# Patient Record
Sex: MALE | Race: WHITE | NOT HISPANIC OR LATINO | Employment: FULL TIME | URBAN - METROPOLITAN AREA
[De-identification: names, ages, dates, MRNs, and addresses within clinical notes are randomized per-mention and may not be internally consistent; named-entity substitution may affect disease eponyms.]

---

## 2017-02-07 ENCOUNTER — OFFICE VISIT (OUTPATIENT)
Dept: FAMILY MEDICINE | Age: 27
End: 2017-02-07

## 2017-02-07 VITALS
SYSTOLIC BLOOD PRESSURE: 122 MMHG | TEMPERATURE: 98.6 F | HEART RATE: 70 BPM | DIASTOLIC BLOOD PRESSURE: 82 MMHG | HEIGHT: 75 IN | RESPIRATION RATE: 12 BRPM | BODY MASS INDEX: 29.84 KG/M2 | WEIGHT: 240 LBS

## 2017-02-07 DIAGNOSIS — H00.012 HORDEOLUM EXTERNUM OF RIGHT LOWER EYELID: Primary | ICD-10-CM

## 2017-02-07 PROCEDURE — 99213 OFFICE O/P EST LOW 20 MIN: CPT | Performed by: FAMILY MEDICINE

## 2018-03-30 ENCOUNTER — NURSE TRIAGE (OUTPATIENT)
Dept: FAMILY MEDICINE | Age: 28
End: 2018-03-30

## 2021-10-02 NOTE — PATIENT INSTRUCTIONS
Due for eye exam  Push fluids-water, gatordade at all times, especially when drinking alcohol  Trial meclizine: try tonight  Things to consider: PT for vertigo, head CT ([phone]), checking labs. If PT not working/helping may want to consider concussion clinic          Preventive Health Recommendations  Male Ages 26 - 39    Yearly exam:             See your health care provider every year in order to  o   Review health changes.   o   Discuss preventive care.    o   Review your medicines if your doctor has prescribed any.    You should be tested each year for STDs (sexually transmitted diseases), if you re at risk.     After age 35, talk to your provider about cholesterol testing. If you are at risk for heart disease, have your cholesterol tested at least every 5 years.     If you are at risk for diabetes, you should have a diabetes test (fasting glucose).  Shots: Get a flu shot each year. Get a tetanus shot every 10 years.     Nutrition:    Eat at least 5 servings of fruits and vegetables daily.     Eat whole-grain bread, whole-wheat pasta and brown rice instead of white grains and rice.     Get adequate Calcium and Vitamin D.     Lifestyle    Exercise for at least 150 minutes a week (30 minutes a day, 5 days a week). This will help you control your weight and prevent disease.     Limit alcohol to one drink per day.     No smoking.     Wear sunscreen to prevent skin cancer.     See your dentist every six months for an exam and cleaning.

## 2021-10-02 NOTE — PROGRESS NOTES
SUBJECTIVE:   CC: Conor Matthew is an 31 year old male who presents for preventative health visit.       Patient has been advised of split billing requirements and indicates understanding: Yes  Healthy Habits:     Getting at least 3 servings of Calcium per day:  NO    Bi-annual eye exam:  NO    Dental care twice a year:  Yes    Sleep apnea or symptoms of sleep apnea:  None    Diet:  Regular (no restrictions)    Frequency of exercise:  1 day/week    Duration of exercise:  Less than 15 minutes    Taking medications regularly:  Yes    Medication side effects:  None    PHQ-2 Total Score: 0    Additional concerns today:  No        July 31st hit his head at a wedding. Hit the front of his head, got right up, was fine, went to sleep. Had been drinking alcohol. Was at another wedding the next day also. Golfed fine.   On the way home felt like having a panic attack, chest was heavy, trouble breathing, hands shaking. Went to the ER-given IVFs, didn't do a head CT. Was thought it was likely related to dehydration. Since then things are slowly improving. Still having issues with driving.  Tuesday was going to  8th grade basketball team, had to pull over, had his fiance get him. He googled a few things like vertigo, inner ear stuff. Is getting  next weekend and doesn't want this to occur then.     Also having some cervical neck issues since hitting his head. Has always cracked his neck but lately he feels more discomfort that comes and goes. Sometimes 5-7/10 in pain. Denies headaches.     Uses nicotine pouches, hasn't been doing it as much lately. Tried to stop cold turkey, soon after 7/31. Still using from time to time. marijuana some tiems  Stopped caffiene after head injury. Restarted 2 weeks ago once in a while    Tuesday, 2 days ago, it lasted the entire night and the next day. Even today still feels vision fuzzy and brain fuzziness. He did work today and felt fine.     Ended up taking a full week at home after  realizing he likely had concussion. Tried doing the vertigo head exercises at home and at first didn't feel the driving anxiety. This past weekend had friends in town, didn't drink too much he feels but also felt increased symptoms.     Feels like when he is in the car it triggers it. Also feels like vision is focusing on one thing and peripheral is a little fuzzy. Vision stuff doesn't make him feel dizzy, but feels he has an aura around his vision. The sun makes things a little worse/eye irritation.     It stresses him out when he is driving.  He is still working daily, works at a computer, doesn't feel he struggles during the day with that.  Had 14 wedding this year.     2 months prior to this, had the same thing occur coming back from a wedding.  He pulled over, let fiance drive, napped then felt better.     Hx of seizure back in college-senior year of college. 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 Not on medication.     When not at weddings, alcohol intake is more than 5 beers on a weekend but recently trying to keep to 4 within a day. Diet: varies between meal planning and fast food/restaurants. During the work week drinks lots of water. Admits on the weekend not staying hydrated.     Going to [facility] on the 19th.       Today's PHQ-2 Score:   PHQ-2 ( 1999 Pfizer) 10/7/2021   Q1: Little interest or pleasure in doing things 0   Q2: Feeling down, depressed or hopeless 0   PHQ-2 Score 0   Q1: Little interest or pleasure in doing things Not at all   Q2: Feeling down, depressed or hopeless Not at all   PHQ-2 Score 0       Abuse: Current or Past(Physical, Sexual or Emotional)- No  Do you feel safe in your environment? No    Have you ever done Advance Care Planning? (For example, a Health Directive, POLST, or a discussion with a medical provider or your loved ones about your wishes): Yes, patient states has an Advance Care Planning document and will bring a copy to the clinic.    Social History      Tobacco Use     Smoking status: Never Smoker     Smokeless tobacco: Current User     Types: Snuff   Substance Use Topics     Alcohol use: Yes     Comment: socially     If you drink alcohol do you typically have >3 drinks per day or >7 drinks per week? No    Alcohol Use 10/7/2021   Prescreen: >3 drinks/day or >7 drinks/week? Yes   Prescreen: >3 drinks/day or >7 drinks/week? -   AUDIT SCORE  8     AUDIT - Alcohol Use Disorders Identification Test - Reproduced from the World Health Organization Audit 2001 (Second Edition) 10/7/2021   1.  How often do you have a drink containing alcohol? 2 to 3 times a week   2.  How many drinks containing alcohol do you have on a typical day when you are drinking? 5 or 6   3.  How often do you have five or more drinks on one occasion? Weekly   4.  How often during the last year have you found that you were not able to stop drinking once you had started? Never   5.  How often during the last year have you failed to do what was normally expected of you because of drinking? Never   6.  How often during the last year have you needed a first drink in the morning to get yourself going after a heavy drinking session? Never   7.  How often during the last year have you had a feeling of guilt or remorse after drinking? Never   8.  How often during the last year have you been unable to remember what happened the night before because of your drinking? Never   9.  Have you or someone else been injured because of your drinking? No   10. Has a relative, friend, doctor or other health care worker been concerned about your drinking or suggested you cut down? No   TOTAL SCORE 8       Last PSA: No results found for: PSA    Reviewed orders with patient. Reviewed health maintenance and updated orders accordingly - Yes  Lab work is in process    Reviewed and updated as needed this visit by clinical staff  Tobacco  Allergies  Meds  Problems  Med Hx  Surg Hx  Fam Hx  Soc Hx          Reviewed and  "updated as needed this visit by Provider  Tobacco   Meds  Problems  Med Hx  Surg Hx  Fam Hx             Review of Systems   Constitutional: Negative for chills and fever.   HENT: Negative for congestion, ear pain, hearing loss and sore throat.    Eyes: Positive for visual disturbance. Negative for pain.   Respiratory: Positive for shortness of breath. Negative for cough.    Cardiovascular: Positive for palpitations. Negative for chest pain and peripheral edema.   Gastrointestinal: Negative for abdominal pain, constipation, diarrhea, heartburn, hematochezia and nausea.   Genitourinary: Negative for discharge, dysuria, frequency, genital sores, hematuria, impotence and urgency.   Musculoskeletal: Negative for arthralgias, joint swelling and myalgias.   Skin: Negative for rash.   Neurological: Positive for dizziness. Negative for weakness, headaches and paresthesias.   Psychiatric/Behavioral: Negative for mood changes. The patient is nervous/anxious.          OBJECTIVE:   /84   Pulse 80   Resp 18   Ht 1.905 m (6' 3\")   Wt 105.2 kg (232 lb)   BMI 29.00 kg/m      Physical Exam  GENERAL: healthy, alert and no distress  EYES: Eyes grossly normal to inspection, PERRL and conjunctivae and sclerae normal  HENT: ear canals and TM's normal, nose and mouth without ulcers or lesions  NECK: no adenopathy, no asymmetry, masses, or scars and thyroid normal to palpation  RESP: lungs clear to auscultation - no rales, rhonchi or wheezes  CV: regular rate and rhythm, normal S1 S2, no S3 or S4, no murmur, click or rub  ABDOMEN: soft, nontender, no hepatosplenomegaly, no masses and bowel sounds normal  MS: no gross musculoskeletal defects noted, no edema  SKIN: no suspicious lesions or rashes  NEURO: Normal strength and tone, mentation intact and speech normal PERRL.  Visual EOMs all extraocular movements noted eye strain but no nystagmus noted  PSYCH: mentation appears normal, affect normal/bright    Diagnostic Test " Results:  Labs reviewed in Epic  Results for orders placed or performed in visit on 10/07/21 (from the past 24 hour(s))   CBC with platelets   Result Value Ref Range    WBC Count 5.6 4.0 - 11.0 10e3/uL    RBC Count 5.28 4.40 - 5.90 10e6/uL    Hemoglobin 16.6 13.3 - 17.7 g/dL    Hematocrit 46.8 40.0 - 53.0 %    MCV 89 78 - 100 fL    MCH 31.4 26.5 - 33.0 pg    MCHC 35.5 31.5 - 36.5 g/dL    RDW 13.1 10.0 - 15.0 %    Platelet Count 206 150 - 450 10e3/uL       ASSESSMENT/PLAN:       ICD-10-CM    1. Routine general medical examination at a health care facility  Z00.00 INFLUENZA VACCINE IM > 6 MONTHS VALENT IIV4 (AFLURIA/FLUZONE)     TDAP VACCINE (Adacel, Boostrix)  [[id number]]   2. Head injury, initial encounter  S09.90XA    3. Need for prophylactic vaccination and inoculation against influenza  Z23    4. History of seizure  Z87.898    5. History of dehydration  Z86.39    6. Vertigo  R42 Comprehensive metabolic panel (BMP + Alb, Alk Phos, ALT, AST, Total. Bili, TP)     CBC with platelets     Magnesium     Physical Therapy Referral     CT Head w/o Contrast     Magnesium     CBC with platelets     Comprehensive metabolic panel (BMP + Alb, Alk Phos, ALT, AST, Total. Bili, TP)     CANCELED: Physical Therapy Referral   7. History of concussion  Z87.820 Physical Therapy Referral     CT Head w/o Contrast     CANCELED: Physical Therapy Referral   8. Neck pain  M54.2 Physical Therapy Referral     CANCELED: Physical Therapy Referral   9. Vision changes  H53.9 CT Head w/o Contrast     Patient aware of additional charges on top of physical with extra test done.  Ongoing vision changes, neck pain, with history of concussion on July 31.  Labs are pending, follow-up with his low therapy, push fluids, consider getting CT of the head to rule out other causes of symptoms    Follow-up in a month or 2 if no improvement        Patient has been advised of split billing requirements and indicates understanding: Yes  COUNSELING:   Reviewed  "preventive health counseling, as reflected in patient instructions    Estimated body mass index is 29 kg/m  as calculated from the following:    Height as of this encounter: 1.905 m (6' 3\").    Weight as of this encounter: 105.2 kg (232 lb).         He reports that he has never smoked. His smokeless tobacco use includes snuff.      Counseling Resources:  ATP IV Guidelines  Pooled Cohorts Equation Calculator  FRAX Risk Assessment  ICSI Preventive Guidelines  Dietary Guidelines for Americans, 2010  USDA's MyPlate  ASA Prophylaxis  Lung CA Screening    VICTORINA Sheridan, NP-C  [facility]    "

## 2021-10-07 ENCOUNTER — OFFICE VISIT (OUTPATIENT)
Dept: FAMILY MEDICINE | Facility: CLINIC | Age: 31
End: 2021-10-07
Payer: COMMERCIAL

## 2021-10-07 VITALS
SYSTOLIC BLOOD PRESSURE: 124 MMHG | HEIGHT: 75 IN | DIASTOLIC BLOOD PRESSURE: 84 MMHG | HEART RATE: 80 BPM | BODY MASS INDEX: 28.85 KG/M2 | WEIGHT: 232 LBS | RESPIRATION RATE: 18 BRPM

## 2021-10-07 DIAGNOSIS — Z86.39 HISTORY OF DEHYDRATION: ICD-10-CM

## 2021-10-07 DIAGNOSIS — Z23 NEED FOR PROPHYLACTIC VACCINATION AND INOCULATION AGAINST INFLUENZA: ICD-10-CM

## 2021-10-07 DIAGNOSIS — M54.2 NECK PAIN: ICD-10-CM

## 2021-10-07 DIAGNOSIS — S09.90XA HEAD INJURY, INITIAL ENCOUNTER: ICD-10-CM

## 2021-10-07 DIAGNOSIS — Z87.898 HISTORY OF SEIZURE: ICD-10-CM

## 2021-10-07 DIAGNOSIS — R42 VERTIGO: ICD-10-CM

## 2021-10-07 DIAGNOSIS — H53.9 VISION CHANGES: ICD-10-CM

## 2021-10-07 DIAGNOSIS — Z00.00 ROUTINE GENERAL MEDICAL EXAMINATION AT A HEALTH CARE FACILITY: Primary | ICD-10-CM

## 2021-10-07 DIAGNOSIS — Z87.820 HISTORY OF CONCUSSION: ICD-10-CM

## 2021-10-07 LAB
ERYTHROCYTE [DISTWIDTH] IN BLOOD BY AUTOMATED COUNT: 13.1 % (ref 10–15)
HCT VFR BLD AUTO: 46.8 % (ref 40–53)
HGB BLD-MCNC: 16.6 G/DL (ref 13.3–17.7)
MCH RBC QN AUTO: 31.4 PG (ref 26.5–33)
MCHC RBC AUTO-ENTMCNC: 35.5 G/DL (ref 31.5–36.5)
MCV RBC AUTO: 89 FL (ref 78–100)
PLATELET # BLD AUTO: 206 10E3/UL (ref 150–450)
RBC # BLD AUTO: 5.28 10E6/UL (ref 4.4–5.9)
WBC # BLD AUTO: 5.6 10E3/UL (ref 4–11)

## 2021-10-07 PROCEDURE — 99385 PREV VISIT NEW AGE 18-39: CPT | Mod: 25 | Performed by: NURSE PRACTITIONER

## 2021-10-07 PROCEDURE — 90472 IMMUNIZATION ADMIN EACH ADD: CPT | Performed by: NURSE PRACTITIONER

## 2021-10-07 PROCEDURE — 90686 IIV4 VACC NO PRSV 0.5 ML IM: CPT | Performed by: NURSE PRACTITIONER

## 2021-10-07 PROCEDURE — 85027 COMPLETE CBC AUTOMATED: CPT | Performed by: NURSE PRACTITIONER

## 2021-10-07 PROCEDURE — 90715 TDAP VACCINE 7 YRS/> IM: CPT | Performed by: NURSE PRACTITIONER

## 2021-10-07 PROCEDURE — 36415 COLL VENOUS BLD VENIPUNCTURE: CPT | Performed by: NURSE PRACTITIONER

## 2021-10-07 PROCEDURE — 80053 COMPREHEN METABOLIC PANEL: CPT | Performed by: NURSE PRACTITIONER

## 2021-10-07 PROCEDURE — 99214 OFFICE O/P EST MOD 30 MIN: CPT | Mod: 25 | Performed by: NURSE PRACTITIONER

## 2021-10-07 PROCEDURE — 90471 IMMUNIZATION ADMIN: CPT | Performed by: NURSE PRACTITIONER

## 2021-10-07 PROCEDURE — 83735 ASSAY OF MAGNESIUM: CPT | Performed by: NURSE PRACTITIONER

## 2021-10-07 ASSESSMENT — MIFFLIN-ST. JEOR: SCORE: 2102.98

## 2021-10-07 ASSESSMENT — ENCOUNTER SYMPTOMS
ARTHRALGIAS: 0
NAUSEA: 0
HEMATOCHEZIA: 0
CHILLS: 0
HEARTBURN: 0
COUGH: 0
HEADACHES: 0
SORE THROAT: 0
HEMATURIA: 0
MYALGIAS: 0
NERVOUS/ANXIOUS: 1
PALPITATIONS: 1
CONSTIPATION: 0
PARESTHESIAS: 0
JOINT SWELLING: 0
DIZZINESS: 1
DYSURIA: 0
SHORTNESS OF BREATH: 1
FREQUENCY: 0
DIARRHEA: 0
EYE PAIN: 0
WEAKNESS: 0
ABDOMINAL PAIN: 0
FEVER: 0

## 2021-10-07 NOTE — LETTER
October 11, 2021      Conor Matthew  [address]  [place] [facility] [address]        Dear ,    We are writing to inform you of your test results.    All of your labs were normal for you.     Please contact the clinic if you have additional questions.  Thank you.      Resulted Orders   Magnesium   Result Value Ref Range    Magnesium 2.1 1.6 - 2.3 mg/dL   CBC with platelets   Result Value Ref Range    WBC Count 5.6 4.0 - 11.0 10e3/uL    RBC Count 5.28 4.40 - 5.90 10e6/uL    Hemoglobin 16.6 13.3 - 17.7 g/dL    Hematocrit 46.8 40.0 - 53.0 %    MCV 89 78 - 100 fL    MCH 31.4 26.5 - 33.0 pg    MCHC 35.5 31.5 - 36.5 g/dL    RDW 13.1 10.0 - 15.0 %    Platelet Count 206 150 - 450 10e3/uL   Comprehensive metabolic panel (BMP + Alb, Alk Phos, ALT, AST, Total. Bili, TP)   Result Value Ref Range    Sodium 138 133 - 144 mmol/L    Potassium 4.3 3.4 - 5.3 mmol/L    Chloride 105 94 - 109 mmol/L    Carbon Dioxide (CO2) 29 20 - 32 mmol/L    Anion Gap 4 3 - 14 mmol/L    Urea Nitrogen 18 7 - 30 mg/dL    Creatinine 1.14 0.66 - 1.25 mg/dL    Calcium 9.2 8.5 - 10.1 mg/dL    Glucose 87 70 - 99 mg/dL    Alkaline Phosphatase 58 40 - 150 U/L    AST 22 0 - 45 U/L    ALT 42 0 - 70 U/L    Protein Total 7.3 6.8 - 8.8 g/dL    Albumin 4.1 3.4 - 5.0 g/dL    Bilirubin Total 0.3 0.2 - 1.3 mg/dL    GFR Estimate 86 >60 mL/min/1.73m2      Comment:      As of July 11, 2021, eGFR is calculated by the CKD-EPI creatinine equation, without race adjustment. eGFR can be influenced by muscle mass, exercise, and diet. The reported eGFR is an estimation only and is only applicable if the renal function is stable.       If you have any questions or concerns, please call the clinic at the number listed above.       Sincerely,      Juli Grant NP

## 2021-10-08 LAB
ALBUMIN SERPL-MCNC: 4.1 G/DL (ref 3.4–5)
ALP SERPL-CCNC: 58 U/L (ref 40–150)
ALT SERPL W P-5'-P-CCNC: 42 U/L (ref 0–70)
ANION GAP SERPL CALCULATED.3IONS-SCNC: 4 MMOL/L (ref 3–14)
AST SERPL W P-5'-P-CCNC: 22 U/L (ref 0–45)
BILIRUB SERPL-MCNC: 0.3 MG/DL (ref 0.2–1.3)
BUN SERPL-MCNC: 18 MG/DL (ref 7–30)
CALCIUM SERPL-MCNC: 9.2 MG/DL (ref 8.5–10.1)
CHLORIDE BLD-SCNC: 105 MMOL/L (ref 94–109)
CO2 SERPL-SCNC: 29 MMOL/L (ref 20–32)
CREAT SERPL-MCNC: 1.14 MG/DL (ref 0.66–1.25)
GFR SERPL CREATININE-BSD FRML MDRD: 86 ML/MIN/1.73M2
GLUCOSE BLD-MCNC: 87 MG/DL (ref 70–99)
MAGNESIUM SERPL-MCNC: 2.1 MG/DL (ref 1.6–2.3)
POTASSIUM BLD-SCNC: 4.3 MMOL/L (ref 3.4–5.3)
PROT SERPL-MCNC: 7.3 G/DL (ref 6.8–8.8)
SODIUM SERPL-SCNC: 138 MMOL/L (ref 133–144)

## 2021-10-08 NOTE — RESULT ENCOUNTER NOTE
Send letter and results    Mr. Matthew,    All of your labs were normal for you.    Please contact the clinic if you have additional questions.  Thank you.    Sincerely,    VICTORINA Sheridan, NP-C  [facility]

## 2021-10-11 ENCOUNTER — TELEPHONE (OUTPATIENT)
Dept: FAMILY MEDICINE | Facility: CLINIC | Age: 31
End: 2021-10-11

## 2021-10-11 ENCOUNTER — HOSPITAL ENCOUNTER (OUTPATIENT)
Dept: PHYSICAL THERAPY | Facility: CLINIC | Age: 31
Setting detail: THERAPIES SERIES
End: 2021-10-11
Attending: NURSE PRACTITIONER
Payer: COMMERCIAL

## 2021-10-11 DIAGNOSIS — Z87.820 HISTORY OF CONCUSSION: ICD-10-CM

## 2021-10-11 DIAGNOSIS — F41.9 ANXIETY: Primary | ICD-10-CM

## 2021-10-11 DIAGNOSIS — F41.9 ANXIETY: ICD-10-CM

## 2021-10-11 DIAGNOSIS — R42 VERTIGO: ICD-10-CM

## 2021-10-11 DIAGNOSIS — M54.2 NECK PAIN: ICD-10-CM

## 2021-10-11 PROCEDURE — 97161 PT EVAL LOW COMPLEX 20 MIN: CPT | Mod: GP | Performed by: PHYSICAL THERAPIST

## 2021-10-11 RX ORDER — PROPRANOLOL HYDROCHLORIDE 10 MG/1
TABLET ORAL
Qty: 60 TABLET | Refills: 0 | Status: SHIPPED | OUTPATIENT
Start: 2021-10-11 | End: 2021-10-11

## 2021-10-11 RX ORDER — PROPRANOLOL HYDROCHLORIDE 10 MG/1
TABLET ORAL
Qty: 60 TABLET | Refills: 0 | Status: SHIPPED | OUTPATIENT
Start: 2021-10-11 | End: 2021-11-08

## 2021-10-11 NOTE — PROGRESS NOTES
10/11/21 1233   Quick Adds   Quick Adds Vestibular Eval   Type of Visit Initial OP PT Evaluation   General Information   Start of Care Date 10/11/21   Referring Physician Juli Grant NP   Orders Evaluate and Treat as Indicated   Order Date 10/07/21   Medical Diagnosis Vertigo, Neck pain, history of conussion   Onset of illness/injury or Date of Surgery 10/07/21  (date of order)   Surgical/Medical history reviewed Yes   Pertinent history of current problem (include personal factors and/or comorbidities that impact the POC) In April 2021, he began feeling of rush of blood to his head, light headed feeling, palms sweaty, feels like he may pass out or like a seizure.  He was driving during this time and pulled over.  He napped for about 2 hours. Upon waking his symptoms had resolved and he was able to continue driving. On July 31, he hit his head while getting into bed. He woke up the next day and played golf and went to a wedding. He consumed some alcohol and did not notice any significant symptoms.  The following day he was driving home and had another episode: rush of blood, light headedness, palms sweaty.  He pulled over and tried to calm down and lie down.  He called 911 and was taken to the ER and he was provided IV fluids and all imaging was normal.  He was taken home by this family.  He ended up driving again a couple days later and fell overwhelmed. He had some sensitivity to light and difficulty with moving environment in his peripheral vision. He had to pull over multiple times during the 5.5 hour drive.  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 He ended up taking about 5-6 days off from work with reduced screen time and activities. He started to feel better, so he returned to work full time and mainly had some symptoms with driving.  Returned to playing volleyball as well.   He felt at about 70% back to normal.  Sensitivity to sun and racing heart rate with exercise.  Then progressed to  80-85% and was tolerating volleyball with stable HR and some minor visual issue.  About one week ago, he drove about 1 hour to Basketball practice. About 25 minutes into the drive, he began having his episode and had to pull over and was picked up by this fiancé.  He has only been getting this episodes when he drives for longer distances and immediately after when he is lying in bed. Heart racing, palm alonso and jittery.  Overall he is nervous he might have these episodes whenever he is driving. Ongoing symptoms: mental fogginess, difficulty with focusing.  Headache: denies.  Dizziness: uncertain. Balance issues: no falls. Neck pain: Right side, sharp pain with flexion and left side bending. Self massage for management. PMH: h/o seizure 2014, possible concussion in college.   Prior level of function comment Works full time in sales. Lives with his fiance in a house.  Independent with all mobility and typically plays volleyball and coaches basketball.    Patient/Family Goals Statement determine cause of his episodes   Fall Risk Screen   Fall screen completed by PT   Have you fallen 2 or more times in the past year? No   Have you fallen and had an injury in the past year? No   Is patient a fall risk? No   Abuse Screen (yes response referral indicated)   Feels Unsafe at Home or Work/School no   Feels Threatened by Someone no   Cognitive Status Examination   Orientation orientation to person, place and time   Level of Consciousness alert   Follows Commands and Answers Questions 100% of the time;able to follow multistep instructions   Posture   Posture Normal   Bed Mobility   Bed Mobility Comments Independent   Transfer Skills   Transfer Comments Independent   Gait   Gait Comments Independent   Balance Special Tests Modified CTSIB Conditions   Condition 1, seconds 30 Seconds   Condition 2, seconds 30 Seconds   Condition 4, seconds 30 Seconds   Condition 5, seconds 30 Seconds   Cervicogenic Screen   Neck ROM  Flexion/extension: WNL, R rotation: 62 degree, L rotation: 69 degrees, Side bending WNL   Alar Ligament Test Normal   Transverse Ligament Test Normal   Oculomotor Exam   Smooth Pursuit Normal   Saccades Normal   VOR Normal   VOR Comments pt reports mild dizziness/blurry vision   VOR Cancellation Normal   Rapid Head Thrust Normal   Convergence Testing Normal   Infrared Goggle Exam or Frenzel Lense Exam   Vestibular Suppressant in Last 24 Hours? No   Exam completed with Infrared Goggles   Spontaneous Nystagmus Negative   Gaze Evoked Nystagmus Negative   Head Shake Horizontal Nystagmus Negative   [place]pike (right) Negative   [place]pike (Left) Negative   HSCC Supine Roll Test (Right) Negative   HSCC Supine Roll Test (Left) Negative   Dynamic Visual Acuity (DVA)   Static Acuity (LogMar) -0.1   Horizontal Head Movement at 1 Hz (LogMar) -0.1   Horizontal Head Movement at 2 Hz (LogMar) -0.1   DVA Comments Mild blurry vision noted post 2Hz   Clinical Impression   Criteria for Skilled Therapeutic Interventions Met evaluation only   Clinical Presentation Stable/Uncomplicated   Clinical Presentation Rationale presentation, negative vestibular findings   Clinical Decision Making (Complexity) Low complexity   Risk & Benefits of therapy have been explained Yes   Patient, Family & other staff in agreement with plan of care Yes   Clinical Impression Comments Pt is a 32 yo male who presents with episodes of blurry vision, racing heart rate, light headedness when driving.  PMH includes prior concussion and h/o seizure.  Negative imaging from recent ER visit in August 2021.  Pt notes episodes started in April 2021 and increased after he hit his head on July 31.  PT evaluation reveals normal oculomotor assessment, negative findings for BPPV and normal DVA.  He also score normal balance on the mCTSIB.  Due to negative findings with evaluation assessment, pt was recommended to return to his referring provider to address other  possible causes of his symptoms.  If pt continues to have symptoms after being addressed by medication or other sources, physical therapy may be indicated to address questionable motion sensitivity while driving.   Total Evaluation Time   PT Fazal Low Complexity Minutes ([id number]) 79

## 2021-10-11 NOTE — TELEPHONE ENCOUNTER
Provider:  Are you willing to give him something for anxiety to get him through the wedding this weekend? They are leaving at 3:00 pm today up [place]. Thank you. Kasey Obando R.N.    This refill/encounter is being handled by a team outside your facility.  If action needs to be taken, please route the encounter back to your team that would normally handle it. Please do not send directly back to the sender. Thank you. Kasey Obando R.N.    Patient was in for a physical last week. Advised PT and he was able to get an  appointment today in [place]. Still experiencing anxiety like symptoms - heart beating really hard, hands sweating.  He is a little anxious. He is asking if there is something that he can be given to help with his anxiety for a short period of time. He is getting  this weekend and is worried about getting through this event. He is aware the provider is done for the day, but we will try to cath her.     Ok to leave a message with the provider's response.

## 2021-10-11 NOTE — TELEPHONE ENCOUNTER
propranolol (INDERAL) 10 MG tablet   Prescription sent to [facility] in [place] on [address], sent as previously ordered by provider.  Patient informed.      Susana Rhodes RN  [facility]

## 2021-10-11 NOTE — TELEPHONE ENCOUNTER
Provider called and spoke to patient.  We can trial propranolol: 10 mg twice a day as needed for anxiety (if he takes it twice within a day, recommend to take at least 4-5 hours apart).  Monitor for lightheadedness, it can also bring the blood pressure down some but at this dose shouldn't bring it down too much. He will update provider if for some reason this medication is not working at all for him in a day or two.     Thank you,  VICTORINA Sheridan, NP-C  [facility]

## 2021-10-11 NOTE — TELEPHONE ENCOUNTER
PT called stating that RX that as sent to [facility] needs to be routed to [facility] in [place] dut to [facility] systems being down. PT states he is headed out of town and needs this sent ASAP I advised PT I would route message at high priority

## 2021-10-26 ENCOUNTER — ANCILLARY PROCEDURE (OUTPATIENT)
Dept: CT IMAGING | Facility: CLINIC | Age: 31
End: 2021-10-26
Attending: NURSE PRACTITIONER
Payer: COMMERCIAL

## 2021-10-26 DIAGNOSIS — Z87.820 HISTORY OF CONCUSSION: ICD-10-CM

## 2021-10-26 DIAGNOSIS — H53.9 VISION CHANGES: ICD-10-CM

## 2021-10-26 DIAGNOSIS — R42 VERTIGO: ICD-10-CM

## 2021-10-26 PROCEDURE — 70450 CT HEAD/BRAIN W/O DYE: CPT | Mod: GC | Performed by: STUDENT IN AN ORGANIZED HEALTH CARE EDUCATION/TRAINING PROGRAM

## 2021-10-26 NOTE — RESULT ENCOUNTER NOTE
Rodger Perdomo,   Your head CT was normal. I hope the propranolol is helping. Follow up with me in a few weeks if things are not improved. I hope your wedding weekend went well!   Thank you,  VICTORINA Sheridan, NP-C  [facility]

## 2021-10-28 ENCOUNTER — TELEPHONE (OUTPATIENT)
Dept: FAMILY MEDICINE | Facility: CLINIC | Age: 31
End: 2021-10-28

## 2021-10-28 NOTE — TELEPHONE ENCOUNTER
Would recommend no driving until appointment next week and would recommend in person appointment instead of video. If he can be fit into an appointment with someone tomorrow preferred otherwise okay to take same day slot on Monday morning for patient.     Thank you,  VICTORINA Sheridan, NP-C  [facility]

## 2021-10-28 NOTE — TELEPHONE ENCOUNTER
"Incoming call received from pt.   Pt reports he is currently using propranolol 20 mg twice daily as needed for anxiety.   Pt states almost all of his sx that were discussed with provider on 10/7/21 continue, except the \"thumping heart\" has resolved since beginning propranolol.    Pt denies any new or worsening sx.   He states he continues to be symptomatic primarily when it is raf, or when driving. He describes sx as \"panic attack-neelima symptoms.\"  Pt states continuing sx include intermittently sweaty hands, dizziness, and vision change affecting peripheral vision. \"Thumping heart\" has resolved.  Pt states PT has evaluated him and explained to him they do not believe his sx are vertigo or concussion related.    Pt is scheduled for video visit with provider on 11/2/21 at 1:20pm. Pt inquires if any further action should be taken prior to the 11/2/21 follow-up visit.     propranolol (INDERAL) 10 MG tablet 60 tablet 0 10/11/2021  No   Sig: Take 10 mg (1 tab) twice a day as needed for anxiety.  May increase to 20 mg (2 tab) twice a day as needed if lower dose not helping       Hi Conor,   Your head CT was normal. I hope the propranolol is helping. Follow up with me in a few weeks if things are not improved. I hope your wedding weekend went well!   Thank you,  VICTORINA Sheridan, NP-[facility]   Written by Juli Grant NP on 10/26/2021 11:48 AM CDT  Seen by patient Conor Matthew on 10/26/2021 11:52 AM CDT    SHERON Pena, RN    "

## 2021-10-29 NOTE — PROGRESS NOTES
Assessment & Plan     Tunnel vision, bilateral  Having tunnel vision/dizziness/anxiety sweaty palms when driving. Also having light sensitiity when it is really bright out. Big crowds also are challenging for him. Consider visual disturbance vs other possible neurological issue vs anxiety  - Adult Neurology Referral; Future  - Adult Eye Referral; Future      Return in about 4 weeks (around 11/29/2021), or if symptoms worsen or fail to improve.    VICTORINA Sheridan, NP-C  [facility] RONALD Perdomo is a 31 year old who presents for the following health issues  accompanied by his self.    Propanolol helps with the heart pounding but all the other symptoms are still there    History of Present Illness       Mental Health Follow-up:  Patient presents to follow-up on Anxiety.    Patient's anxiety since last visit has been:  Medium  The patient is not having other symptoms associated with anxiety.  Any significant life events: health concerns  Patient is feeling anxious or having panic attacks.  Patient has no concerns about alcohol or drug use.     Social History  Tobacco Use    Smoking status: Never Smoker    Smokeless tobacco: Current User      Types: Snuff  Vaping Use    Vaping Use: Never used  Alcohol use: Yes    Comment: socially  Drug use: Yes    Types: Marijuana    Comment: socially      Today's PHQ-9         PHQ-9 Total Score:     (P) 1   PHQ-9 Q9 Thoughts of better off dead/self-harm past 2 weeks :   (P) Not at all   Thoughts of suicide or self harm:      Self-harm Plan:        Self-harm Action:          Safety concerns for self or others:           He eats 0-1 servings of fruits and vegetables daily.He consumes 0 sweetened beverage(s) daily.He exercises with enough effort to increase his heart rate 10 to 19 minutes per day.  He exercises with enough effort to increase his heart rate 3 or less days per week.   He is taking medications regularly.         Taking propranolol 20 mg  "twice a day. Doesn't get pounding heart anymore. He drove last night short distance from a friends, hands very sweating, feeling like he had trouble focusing.  Lights, big crowds, driving are his main triggers with sweating palms. He almost feels like he can't swallow. ? Vertical heterophoria. binacular vision dysfuntion. Mis-allignment in eyes.     Had to use it twice a day during his wedding. After pictures he had to go lay down a bit. On honeymoon had a few issues, he was still able to go and do stuff.     Hx of anemia, low iron  Father hx DVT in 2010 in lungs  Aunt uncle have factor 5  Some hypoglycemia in family  Mother has hypothyroidism      Got COVID-19 vaccine few days before [facility]. Occurred first at [facility] 2021, then again July 31st after he hit his head at a wedding few days after.     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 It is in [place]        Review of Systems   Constitutional, HEENT, cardiovascular, pulmonary, gi and gu systems are negative, except as otherwise noted.      Objective    /88 (BP Location: Right arm, Patient Position: Sitting, Cuff Size: Adult Large)   Pulse 69   Temp 98.4  F (36.9  C) (Oral)   Resp 16   Ht 1.892 m (6' 2.5\")   Wt 107 kg (236 lb)   SpO2 100%   BMI 29.90 kg/m    Body mass index is 29.9 kg/m .  Physical Exam   GENERAL: healthy, alert and no distress  EYES: Eyes grossly normal to inspection, PERRL and conjunctivae and sclerae normal  HENT: ear canals and TM's normal, nose and mouth without ulcers or lesions  NECK: no adenopathy, no asymmetry, masses, or scars and thyroid normal to palpation  RESP: lungs clear to auscultation - no rales, rhonchi or wheezes  CV: regular rate and rhythm, normal S1 S2, no S3 or S4, no murmur, click or rub  MS: no gross musculoskeletal defects noted, no edema  NEURO: Normal strength and tone, mentation intact and speech normal, PERRL, EOM caused some difficulty with patient feeling like " his eyes were not keeping up with the movements    No labs today, reviewed  Past ones            Answers for HPI/ROS submitted by the patient on 11/1/2021  If you checked off any problems, how difficult have these problems made it for you to do your work, take care of things at home, or get along with other people?: Somewhat difficult  PHQ9 TOTAL SCORE: 1  ERWIN 7 TOTAL SCORE: 10       yes

## 2021-11-01 ENCOUNTER — TELEPHONE (OUTPATIENT)
Dept: FAMILY MEDICINE | Facility: CLINIC | Age: 31
End: 2021-11-01

## 2021-11-01 ENCOUNTER — OFFICE VISIT (OUTPATIENT)
Dept: FAMILY MEDICINE | Facility: CLINIC | Age: 31
End: 2021-11-01
Payer: COMMERCIAL

## 2021-11-01 VITALS
HEART RATE: 69 BPM | WEIGHT: 236 LBS | OXYGEN SATURATION: 100 % | RESPIRATION RATE: 16 BRPM | HEIGHT: 75 IN | SYSTOLIC BLOOD PRESSURE: 127 MMHG | BODY MASS INDEX: 29.34 KG/M2 | TEMPERATURE: 98.4 F | DIASTOLIC BLOOD PRESSURE: 88 MMHG

## 2021-11-01 DIAGNOSIS — F41.9 ANXIETY: Primary | ICD-10-CM

## 2021-11-01 DIAGNOSIS — H53.483 TUNNEL VISION, BILATERAL: Primary | ICD-10-CM

## 2021-11-01 PROCEDURE — 99213 OFFICE O/P EST LOW 20 MIN: CPT | Performed by: NURSE PRACTITIONER

## 2021-11-01 RX ORDER — BUSPIRONE HYDROCHLORIDE 5 MG/1
TABLET ORAL
Qty: 90 TABLET | Refills: 1 | Status: SHIPPED | OUTPATIENT
Start: 2021-11-01 | End: 2021-12-16

## 2021-11-01 ASSESSMENT — ANXIETY QUESTIONNAIRES
7. FEELING AFRAID AS IF SOMETHING AWFUL MIGHT HAPPEN: NEARLY EVERY DAY
3. WORRYING TOO MUCH ABOUT DIFFERENT THINGS: MORE THAN HALF THE DAYS
GAD7 TOTAL SCORE: 10
GAD7 TOTAL SCORE: 10
1. FEELING NERVOUS, ANXIOUS, OR ON EDGE: MORE THAN HALF THE DAYS
4. TROUBLE RELAXING: NOT AT ALL
GAD7 TOTAL SCORE: 10
2. NOT BEING ABLE TO STOP OR CONTROL WORRYING: NEARLY EVERY DAY
5. BEING SO RESTLESS THAT IT IS HARD TO SIT STILL: NOT AT ALL
6. BECOMING EASILY ANNOYED OR IRRITABLE: NOT AT ALL
8. IF YOU CHECKED OFF ANY PROBLEMS, HOW DIFFICULT HAVE THESE MADE IT FOR YOU TO DO YOUR WORK, TAKE CARE OF THINGS AT HOME, OR GET ALONG WITH OTHER PEOPLE?: SOMEWHAT DIFFICULT
7. FEELING AFRAID AS IF SOMETHING AWFUL MIGHT HAPPEN: NEARLY EVERY DAY

## 2021-11-01 ASSESSMENT — PATIENT HEALTH QUESTIONNAIRE - PHQ9
SUM OF ALL RESPONSES TO PHQ QUESTIONS 1-9: 1
SUM OF ALL RESPONSES TO PHQ QUESTIONS 1-9: 1
10. IF YOU CHECKED OFF ANY PROBLEMS, HOW DIFFICULT HAVE THESE PROBLEMS MADE IT FOR YOU TO DO YOUR WORK, TAKE CARE OF THINGS AT HOME, OR GET ALONG WITH OTHER PEOPLE: SOMEWHAT DIFFICULT

## 2021-11-01 ASSESSMENT — MIFFLIN-ST. JEOR: SCORE: 2113.18

## 2021-11-01 ASSESSMENT — PAIN SCALES - GENERAL: PAINLEVEL: NO PAIN (0)

## 2021-11-01 NOTE — TELEPHONE ENCOUNTER
Patient called.     He sates he called the number from the referral today, he was told that there is no longer an Ophthalmologist that works here, only Optometrist.    Order: Adult Eye Referral  [facility]   [id number] [address] [place] [id number]   Phone: [phone]     Routing to Juli Grant NP to please advise.  Is there a different Eye Clinic?    Suzanne Melgoza RN, [facility]

## 2021-11-01 NOTE — PATIENT INSTRUCTIONS
buspar for anxiety? -low side effect profile, can use as needed  Sertraline for anxiety? -slightly higher possible side effects, but can take 2-6 weeks to kick in

## 2021-11-01 NOTE — TELEPHONE ENCOUNTER
buspar 5 mg 2-3 times a day as needed sent to pharmacy. May increase to 10 mg as needed.    Provider called patient and updated him on above.  He will update provider Friday or next Monday on how things are going and if it is working.    VICTORINA Sheridan, NP-C  [facility]

## 2021-11-01 NOTE — TELEPHONE ENCOUNTER
Patient is calling to inform that he would like to start Buspar.    He would like to be informed when medication is sent to the pharmacy.    Sandie Funez RN     No

## 2021-11-01 NOTE — TELEPHONE ENCOUNTER
There isn't a different eye clinic. Was not aware the ophthalmologist no longer works here. He can see what else his insurance will cover outside of [place] and if he needs a referral I can update the referral.    Thank you,  VICTORINA Sheridan, NP-C  [facility]

## 2021-11-02 ASSESSMENT — PATIENT HEALTH QUESTIONNAIRE - PHQ9: SUM OF ALL RESPONSES TO PHQ QUESTIONS 1-9: 1

## 2021-11-02 ASSESSMENT — ANXIETY QUESTIONNAIRES: GAD7 TOTAL SCORE: 10

## 2021-11-08 ENCOUNTER — MYC REFILL (OUTPATIENT)
Dept: FAMILY MEDICINE | Facility: CLINIC | Age: 31
End: 2021-11-08
Payer: COMMERCIAL

## 2021-11-08 DIAGNOSIS — F41.9 ANXIETY: ICD-10-CM

## 2021-11-10 RX ORDER — PROPRANOLOL HYDROCHLORIDE 10 MG/1
TABLET ORAL
Qty: 60 TABLET | Refills: 0 | Status: SHIPPED | OUTPATIENT
Start: 2021-11-10 | End: 2021-12-16

## 2021-12-16 ENCOUNTER — OFFICE VISIT (OUTPATIENT)
Dept: NEUROLOGY | Facility: CLINIC | Age: 31
End: 2021-12-16
Attending: NURSE PRACTITIONER
Payer: COMMERCIAL

## 2021-12-16 VITALS
HEART RATE: 77 BPM | HEIGHT: 74 IN | BODY MASS INDEX: 30.8 KG/M2 | SYSTOLIC BLOOD PRESSURE: 133 MMHG | OXYGEN SATURATION: 100 % | WEIGHT: 240 LBS | DIASTOLIC BLOOD PRESSURE: 79 MMHG

## 2021-12-16 DIAGNOSIS — S09.90XA HEAD INJURY, INITIAL ENCOUNTER: ICD-10-CM

## 2021-12-16 DIAGNOSIS — H53.483 TUNNEL VISION, BILATERAL: ICD-10-CM

## 2021-12-16 DIAGNOSIS — Z87.820 HISTORY OF CONCUSSION: Primary | ICD-10-CM

## 2021-12-16 DIAGNOSIS — Z87.898 HISTORY OF SEIZURE: ICD-10-CM

## 2021-12-16 PROCEDURE — 99204 OFFICE O/P NEW MOD 45 MIN: CPT | Performed by: PSYCHIATRY & NEUROLOGY

## 2021-12-16 PROCEDURE — G0463 HOSPITAL OUTPT CLINIC VISIT: HCPCS

## 2021-12-16 ASSESSMENT — MIFFLIN-ST. JEOR: SCORE: 2123.38

## 2021-12-16 NOTE — NURSING NOTE
"Conor Matthew is a 31 year old male who presents for:  Chief Complaint   Patient presents with     Referral     Concussion type injury, shortness of breath, anxiety like symptons        Initial Vitals:  /79   Pulse 77   Ht 1.88 m (6' 2\")   Wt 108.9 kg (240 lb)   SpO2 100%   BMI 30.81 kg/m   Estimated body mass index is 30.81 kg/m  as calculated from the following:    Height as of this encounter: 1.88 m (6' 2\").    Weight as of this encounter: 108.9 kg (240 lb).. Body surface area is 2.38 meters squared. BP completed using cuff size: regular    Nursing Comments:   Mireille Stephens    "

## 2021-12-16 NOTE — LETTER
12/16/2021         RE: Conor Matthew  [address]  [place] [address]        Dear Colleague,    Thank you for referring your patient, Conor Matthew, to the [facility] [place]. Please see a copy of my visit note below.        [facility]    Conor Matthew MRN# [id number]   Age: 31 year old YOB: 1990     Requesting physician: Juli Grant  No Ref-Primary, Physician            Assessment and Plan:   Assessment:  1.  Paroxysmal symptoms of ? origin     Plan:  1.  Continue observation for now.  If persistent, consider psychological review, OT evaluation of post concussive difficulties, or medication for migraine prophylaxis.    Overall, I am not identifying any neuro pathology to account for his paroxysmal difficulties.  Although I am uncertain as to whether he has a possible diagnosis of panic disorder, I am more inclined to think that he may have suffered some residual migraine type symptoms following his concussion on 29 July which are now generally subsiding and resolving spontaneously even without medications.  His clinical exam, history, and review of his CT imaging is reassuring and thus no additional neurologic investigation at this time is required.  If he does not have complete resolution of his difficulties, I would proceed as noted above.             History of Present Illness:   CC:    I met with Mr. Matthew today for 45 minutes to review issues he is having with some paroxysmal neurological symptoms.  He is a very healthy 31-year-old young man without antecedent neurological difficulties although reports in retrospect during his academic and athletic career while playing soccer he developed head pains from taking soccer headers although never sustained any obvious concussive residuals.    He had had no additional issues until just this year when he began developing paroxysmal episodes, generally while operating his motor vehicle consisting of a sense of anxiety,  shortness of breath, sweaty palms, irritability with peripheral vision and palpitations.  Indicates that the very first event occurred in April of this year while driving with his wife/isaac having the onset of those symptoms resolving very quickly in a spontaneous fashion.  He had no further difficulties until Friday night July 29 when after a wedding during which time he consumed a fair amount of alcohol, he struck his head on the bed while retiring for the evening.  He states that he really had no recognition of any difficulties as a result of that, slept well, and played 18 holes of golf the very next morning without difficulty.  That same afternoon however he was at a second wedding and indicated that he partied very late into the night once again consuming a fair amount of alcohol as well as THC.  The following morning, Sunday, August 1, he had the onset of a rather significant set of symptoms as noted above and presented to the emergency department where he was evaluated clinically, underwent a cardiac work-up as well as general physical review and was found to be in good health.    Since then, he has had recurring episodes initially, in August, occurring almost every day prompting investigation including clinical exam, laboratory assessments and CT imaging of the brain space (which I personally reviewed) not revealing any evidence of pathology.  He was subsequently started on both propranolol and buspirone and at the same time, upon recommendation from one of his friends, started doing postconcussive eye movement exercises.  He indicates that his symptoms have begun to subside and in fact he is now experiencing difficulties only of mild proportion occurring once weekly.  He has recently discontinued both propranolol and the BuSpar without recognition of any worsening difficulties.  He is seen today for neurological review.    He has also undergone a PT assessment to see if he had any findings consistent  with vertigo and he does not.  I believe he is scheduled further to see ophthalmology to identify the possibility of any convergence insufficiency.    At the present moment, he has no neurological complaints of any nature and his neurologic review of systems is completely noncontributory for any headaches, vertigo, diplopia, dysarthria, dysphagia, weakness numbness or tingling, loss of consciousness, or seizures.    His past history is remarkable for a single seizure occurring in 2013 related to dehydration.  Work-up at that time disclose no organic neuro pathology requiring further management.  He has not had any seizures or any other neurologic manifestation since.    His family history is remarkable for his brother suffering migraine but he is unaware of other family members with similar issues.    The balance of his medical health is reviewed below.             Physical Exam:   Is present neurological examination including review of his mental status, cranial nerve, motor, sensory, coordination Station gait, reflexes, and funduscopic exam are all completely normal.  His optic disc edges are sharp he has good venous pulsations and no evidence of any retinopathy.  His eye movements appear full and conjugate throughout, visual fields are normal.         Data:   All laboratory data reviewed  All imaging studies reviewed by me             DATA for DOCUMENTATION:         Past Medical History:     Patient Active Problem List   Diagnosis     Head injury, initial encounter     History of seizure     History of dehydration     History of concussion     Past Medical History:   Diagnosis Date     IBS (irritable bowel syndrome)      Seasonal allergic rhinitis due to pollen        Also see scanned health assessment forms.       Past Surgical History:     Past Surgical History:   Procedure Laterality Date     KNEE SURGERY       SHOULDER SURGERY      left and right      WISDOM TOOTH EXTRACTION              Social History:      Social History     Socioeconomic History     Marital status:      Spouse name: Not on file     Number of children: Not on file     Years of education: Not on file     Highest education level: Not on file   Occupational History     Not on file   Tobacco Use     Smoking status: Never Smoker     Smokeless tobacco: Current User     Types: Snuff   Vaping Use     Vaping Use: Never used   Substance and Sexual Activity     Alcohol use: Yes     Comment: socially     Drug use: Yes     Types: Marijuana     Comment: socially     Sexual activity: Yes     Partners: Female   Other Topics Concern     Not on file   Social History Narrative     Not on file     Social Determinants of Health     Financial Resource Strain: Not on file   Food Insecurity: Not on file   Transportation Needs: Not on file   Physical Activity: Not on file   Stress: Not on file   Social Connections: Not on file   Intimate Partner Violence: Not on file   Housing Stability: Not on file              Family History:     Family History   Problem Relation Age of Onset     Pulmonary Embolism Father      Breast Cancer Maternal Grandmother      Prostate Cancer Paternal Grandfather      Factor V Leiden deficiency Paternal Aunt      Factor V Leiden deficiency Paternal Uncle      Asthma No family hx of      Seizure Disorder No family hx of             Medications:     Current Outpatient Medications   Medication Sig     busPIRone (BUSPAR) 5 MG tablet Take 5 mg two to three times a day as needed for anxiety. May increase up to 10 mg if needed     propranolol (INDERAL) 10 MG tablet Take 10 mg (1 tab) twice a day as needed for anxiety.  May increase to 20 mg (2 tab) twice a day as needed if lower dose not helping     No current facility-administered medications for this visit.              Review of Systems:   A comprehensive 10 point review of systems (constitutional, ENT, cardiac, peripheral vascular, lymphatic, respiratory, GI, , Musculoskeletal, skin,  Neurological) was performed and found to be negative except as described in this note.     See intake form completed by patient        Again, thank you for allowing me to participate in the care of your patient.        Sincerely,        Elías Hidalgo MD, MD

## 2021-12-16 NOTE — PROGRESS NOTES
[facility]    Conor Matthew MRN# [id number]   Age: 31 year old YOB: 1990     Requesting physician: Juli Grant  No Ref-Primary, Physician            Assessment and Plan:   Assessment:  1.  Paroxysmal symptoms of ? origin     Plan:  1.  Continue observation for now.  If persistent, consider psychological review, OT evaluation of post concussive difficulties, or medication for migraine prophylaxis.    Overall, I am not identifying any neuro pathology to account for his paroxysmal difficulties.  Although I am uncertain as to whether he has a possible diagnosis of panic disorder, I am more inclined to think that he may have suffered some residual migraine type symptoms following his concussion on 29 July which are now generally subsiding and resolving spontaneously even without medications.  His clinical exam, history, and review of his CT imaging is reassuring and thus no additional neurologic investigation at this time is required.  If he does not have complete resolution of his difficulties, I would proceed as noted above.             History of Present Illness:   CC:    I met with Mr. Matthew today for 45 minutes to review issues he is having with some paroxysmal neurological symptoms.  He is a very healthy 31-year-old young man without antecedent neurological difficulties although reports in retrospect during his academic and athletic career while playing soccer he developed head pains from taking soccer headers although never sustained any obvious concussive residuals.    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  Indicates that the very first event occurred in April of this year while driving with his wife/fiancée having the onset of those symptoms resolving very quickly in  a spontaneous fashion.  He had no further difficulties until Friday night July 29 when after a wedding during which time he consumed a fair amount of alcohol, he struck his head on the bed while retiring for the evening.  He states that he really had no recognition of any difficulties as a result of that, slept well, and played 18 holes of golf the very next morning without difficulty.  That same afternoon however he was at a second wedding and indicated that he partied very late into the night once again consuming a fair amount of alcohol as well as THC.  The following morning, Sunday, August 1, he had the onset of a rather significant set of symptoms as noted above and presented to the emergency department where he was evaluated clinically, underwent a cardiac work-up as well as general physical review and was found to be in good health.    Since then, he has had recurring episodes initially, in August, occurring almost every day prompting investigation including clinical exam, laboratory assessments and CT imaging of the brain space (which I personally reviewed) not revealing any evidence of pathology.  He was subsequently started on both propranolol and buspirone and at the same time, upon recommendation from one of his friends, started doing postconcussive eye movement exercises.  He indicates that his symptoms have begun to subside and in fact he is now experiencing difficulties only of mild proportion occurring once weekly.  He has recently discontinued both propranolol and the BuSpar without recognition of any worsening difficulties.  He is seen today for neurological review.    He has also undergone a PT assessment to see if he had any findings consistent with vertigo and he does not.  I believe he is scheduled further to see ophthalmology to identify the possibility of any convergence insufficiency.    At the present moment, he has no neurological complaints of any nature and his neurologic review of  systems is completely noncontributory for any headaches, vertigo, diplopia, dysarthria, dysphagia, weakness numbness or tingling, loss of consciousness, or seizures.    His past history is remarkable for a single seizure occurring in 2013 related to dehydration.  Work-up at that time disclose no organic neuro pathology requiring further management.  He has not had any seizures or any other neurologic manifestation since.    His family history is remarkable for his brother suffering migraine but he is unaware of other family members with similar issues.    The balance of his medical health is reviewed below.             Physical Exam:   Is present neurological examination including review of his mental status, cranial nerve, motor, sensory, coordination Station gait, reflexes, and funduscopic exam are all completely normal.  His optic disc edges are sharp he has good venous pulsations and no evidence of any retinopathy.  His eye movements appear full and conjugate throughout, visual fields are normal.         Data:   All laboratory data reviewed  All imaging studies reviewed by me             DATA for DOCUMENTATION:         Past Medical History:     Patient Active Problem List   Diagnosis     Head injury, initial encounter     History of seizure     History of dehydration     History of concussion     Past Medical History:   Diagnosis Date     IBS (irritable bowel syndrome)      Seasonal allergic rhinitis due to pollen        Also see scanned health assessment forms.       Past Surgical History:     Past Surgical History:   Procedure Laterality Date     KNEE SURGERY       SHOULDER SURGERY      left and right      WISDOM TOOTH EXTRACTION              Social History:     Social History     Socioeconomic History     Marital status:      Spouse name: Not on file     Number of children: Not on file     Years of education: Not on file     Highest education level: Not on file   Occupational History     Not on file    Tobacco Use     Smoking status: Never Smoker     Smokeless tobacco: Current User     Types: Snuff   Vaping Use     Vaping Use: Never used   Substance and Sexual Activity     Alcohol use: Yes     Comment: socially     Drug use: Yes     Types: Marijuana     Comment: socially     Sexual activity: Yes     Partners: Female   Other Topics Concern     Not on file   Social History Narrative     Not on file     Social Determinants of Health     Financial Resource Strain: Not on file   Food Insecurity: Not on file   Transportation Needs: Not on file   Physical Activity: Not on file   Stress: Not on file   Social Connections: Not on file   Intimate Partner Violence: Not on file   Housing Stability: Not on file              Family History:     Family History   Problem Relation Age of Onset     Pulmonary Embolism Father      Breast Cancer Maternal Grandmother      Prostate Cancer Paternal Grandfather      Factor V Leiden deficiency Paternal Aunt      Factor V Leiden deficiency Paternal Uncle      Asthma No family hx of      Seizure Disorder No family hx of             Medications:     Current Outpatient Medications   Medication Sig     busPIRone (BUSPAR) 5 MG tablet Take 5 mg two to three times a day as needed for anxiety. May increase up to 10 mg if needed     propranolol (INDERAL) 10 MG tablet Take 10 mg (1 tab) twice a day as needed for anxiety.  May increase to 20 mg (2 tab) twice a day as needed if lower dose not helping     No current facility-administered medications for this visit.              Review of Systems:   A comprehensive 10 point review of systems (constitutional, ENT, cardiac, peripheral vascular, lymphatic, respiratory, GI, , Musculoskeletal, skin, Neurological) was performed and found to be negative except as described in this note.     See intake form completed by patient

## 2022-03-16 ENCOUNTER — TELEPHONE (OUTPATIENT)
Dept: FAMILY MEDICINE | Facility: CLINIC | Age: 32
End: 2022-03-16
Payer: COMMERCIAL

## 2022-03-16 NOTE — TELEPHONE ENCOUNTER
Panic attack today and he states that this has been happening more often again and getting worse. Was taking propanolol and buspirone for a short time which he did not feel was helping adequately so stopped. Set up a virtual appointment to discuss with a provider.     Saskia Castillo RN [facility]

## 2022-03-17 ENCOUNTER — VIRTUAL VISIT (OUTPATIENT)
Dept: FAMILY MEDICINE | Facility: CLINIC | Age: 32
End: 2022-03-17
Payer: COMMERCIAL

## 2022-03-17 DIAGNOSIS — Z13.29 SCREENING FOR THYROID DISORDER: ICD-10-CM

## 2022-03-17 DIAGNOSIS — F41.9 ANXIETY: Primary | ICD-10-CM

## 2022-03-17 DIAGNOSIS — Z13.1 SCREENING FOR DIABETES MELLITUS: ICD-10-CM

## 2022-03-17 DIAGNOSIS — Z13.220 SCREENING FOR HYPERLIPIDEMIA: ICD-10-CM

## 2022-03-17 PROCEDURE — 99213 OFFICE O/P EST LOW 20 MIN: CPT | Mod: GT | Performed by: PHYSICIAN ASSISTANT

## 2022-03-17 RX ORDER — HYDROXYZINE HYDROCHLORIDE 25 MG/1
25 TABLET, FILM COATED ORAL EVERY 6 HOURS PRN
Qty: 90 TABLET | Refills: 1 | Status: SHIPPED | OUTPATIENT
Start: 2022-03-17 | End: 2022-06-23

## 2022-03-17 ASSESSMENT — ANXIETY QUESTIONNAIRES
5. BEING SO RESTLESS THAT IT IS HARD TO SIT STILL: NOT AT ALL
GAD7 TOTAL SCORE: 3
4. TROUBLE RELAXING: NOT AT ALL
2. NOT BEING ABLE TO STOP OR CONTROL WORRYING: SEVERAL DAYS
5. BEING SO RESTLESS THAT IT IS HARD TO SIT STILL: NOT AT ALL
IF YOU CHECKED OFF ANY PROBLEMS ON THIS QUESTIONNAIRE, HOW DIFFICULT HAVE THESE PROBLEMS MADE IT FOR YOU TO DO YOUR WORK, TAKE CARE OF THINGS AT HOME, OR GET ALONG WITH OTHER PEOPLE: SOMEWHAT DIFFICULT
6. BECOMING EASILY ANNOYED OR IRRITABLE: NOT AT ALL
6. BECOMING EASILY ANNOYED OR IRRITABLE: NOT AT ALL
GAD7 TOTAL SCORE: 3
3. WORRYING TOO MUCH ABOUT DIFFERENT THINGS: NOT AT ALL
7. FEELING AFRAID AS IF SOMETHING AWFUL MIGHT HAPPEN: SEVERAL DAYS
7. FEELING AFRAID AS IF SOMETHING AWFUL MIGHT HAPPEN: SEVERAL DAYS
3. WORRYING TOO MUCH ABOUT DIFFERENT THINGS: NOT AT ALL
GAD7 TOTAL SCORE: 3
7. FEELING AFRAID AS IF SOMETHING AWFUL MIGHT HAPPEN: SEVERAL DAYS
GAD7 TOTAL SCORE: 3
1. FEELING NERVOUS, ANXIOUS, OR ON EDGE: SEVERAL DAYS
2. NOT BEING ABLE TO STOP OR CONTROL WORRYING: SEVERAL DAYS
1. FEELING NERVOUS, ANXIOUS, OR ON EDGE: SEVERAL DAYS

## 2022-03-17 ASSESSMENT — PATIENT HEALTH QUESTIONNAIRE - PHQ9
SUM OF ALL RESPONSES TO PHQ QUESTIONS 1-9: 2
5. POOR APPETITE OR OVEREATING: NOT AT ALL

## 2022-03-17 ASSESSMENT — ENCOUNTER SYMPTOMS: NERVOUS/ANXIOUS: 1
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
## 2022-03-17 NOTE — PATIENT INSTRUCTIONS
Try hydroxyzine 25 mg 1-2 tablets every 6 hours as needed for anxiety  Follow up with us in 3-4 weeks.   Please schedule a fasting lab appointment (nothing to eat or drink 9 hours prior except water and/or your medications) at your earliest convenience.    Schedule your eye exam   If no improvement with medication consider psychotherapy to help manage symptoms

## 2022-03-18 ASSESSMENT — ANXIETY QUESTIONNAIRES: GAD7 TOTAL SCORE: 3

## 2022-03-22 ENCOUNTER — TELEPHONE (OUTPATIENT)
Dept: FAMILY MEDICINE | Facility: CLINIC | Age: 32
End: 2022-03-22
Payer: COMMERCIAL

## 2022-03-22 DIAGNOSIS — F41.9 ANXIETY: Primary | ICD-10-CM

## 2022-03-22 NOTE — TELEPHONE ENCOUNTER
Patient calling because he did a virtual appointment last week with Mimi Saldivar PA-C and is following up.  Hydroxyzine was ordered and patient started taking it.  He had 2 major panic attacks over the weekend and thinks the medication is not working. Patient requesting something better/ stronger. Patient also asking if provider agrees for him to see a mental health provider. If so, he is ready to make an appointment.     This RN pended MH referral and pharmacy.  Routing to provider to advise.  Alla PEREZN, RN

## 2022-03-22 NOTE — TELEPHONE ENCOUNTER
Needs another virtual visit to review and discuss  Would recommend daily medication to help with anxiety like lexapro or zoloft to help with anxiety  I do not recommend benzo type medications but could try very limited supply of this while we wait for lexapro or zoloft to kick in but needs a virtual visit to review and discuss

## 2022-03-22 NOTE — TELEPHONE ENCOUNTER
Pt notified via phone of provider message below as written.    Pt agrees to provider's plan, and agrees to schedule another virtual visit.    Pt states he is in [place] this week, returning to [place] next week.   Pt inquires if provider can do a virtual visit with him for this issue while he is still in [place], or does he have to wait until he returns to [place] to complete a virtual visit for this issue.

## 2022-03-23 NOTE — TELEPHONE ENCOUNTER
Called patient back regarding provider message below - pt reporting he may be coming back to [place] this week. Pt planning to call if he is back in town to set up a virtual visit.     Will await his call or My Chart message.     Adina Gonzales RN, BSN  [facility]

## 2022-04-07 ENCOUNTER — LAB (OUTPATIENT)
Dept: LAB | Facility: CLINIC | Age: 32
End: 2022-04-07
Payer: COMMERCIAL

## 2022-04-07 ENCOUNTER — TELEPHONE (OUTPATIENT)
Dept: OPHTHALMOLOGY | Facility: CLINIC | Age: 32
End: 2022-04-07

## 2022-04-07 ENCOUNTER — OFFICE VISIT (OUTPATIENT)
Dept: OPTOMETRY | Facility: CLINIC | Age: 32
End: 2022-04-07
Payer: COMMERCIAL

## 2022-04-07 DIAGNOSIS — F41.9 ANXIETY: ICD-10-CM

## 2022-04-07 DIAGNOSIS — H52.221 REGULAR ASTIGMATISM OF RIGHT EYE: ICD-10-CM

## 2022-04-07 DIAGNOSIS — Z13.29 SCREENING FOR THYROID DISORDER: ICD-10-CM

## 2022-04-07 DIAGNOSIS — Z01.00 EXAMINATION OF EYES AND VISION: Primary | ICD-10-CM

## 2022-04-07 DIAGNOSIS — Z87.820 HISTORY OF CONCUSSION: ICD-10-CM

## 2022-04-07 DIAGNOSIS — Z13.220 SCREENING FOR HYPERLIPIDEMIA: ICD-10-CM

## 2022-04-07 DIAGNOSIS — Z13.1 SCREENING FOR DIABETES MELLITUS: ICD-10-CM

## 2022-04-07 LAB
ALBUMIN SERPL-MCNC: 4 G/DL (ref 3.4–5)
ALP SERPL-CCNC: 47 U/L (ref 40–150)
ALT SERPL W P-5'-P-CCNC: 46 U/L (ref 0–70)
ANION GAP SERPL CALCULATED.3IONS-SCNC: 3 MMOL/L (ref 3–14)
AST SERPL W P-5'-P-CCNC: 44 U/L (ref 0–45)
BILIRUB SERPL-MCNC: 0.7 MG/DL (ref 0.2–1.3)
BUN SERPL-MCNC: 17 MG/DL (ref 7–30)
CALCIUM SERPL-MCNC: 9.3 MG/DL (ref 8.5–10.1)
CHLORIDE BLD-SCNC: 105 MMOL/L (ref 94–109)
CHOLEST SERPL-MCNC: 228 MG/DL
CO2 SERPL-SCNC: 31 MMOL/L (ref 20–32)
CREAT SERPL-MCNC: 1.11 MG/DL (ref 0.66–1.25)
FASTING STATUS PATIENT QL REPORTED: YES
GFR SERPL CREATININE-BSD FRML MDRD: >90 ML/MIN/1.73M2
GLUCOSE BLD-MCNC: 85 MG/DL (ref 70–99)
HDLC SERPL-MCNC: 45 MG/DL
LDLC SERPL CALC-MCNC: 148 MG/DL
NONHDLC SERPL-MCNC: 183 MG/DL
POTASSIUM BLD-SCNC: 4 MMOL/L (ref 3.4–5.3)
PROT SERPL-MCNC: 7.5 G/DL (ref 6.8–8.8)
SODIUM SERPL-SCNC: 139 MMOL/L (ref 133–144)
TRIGL SERPL-MCNC: 174 MG/DL
TSH SERPL DL<=0.005 MIU/L-ACNC: 1.64 MU/L (ref 0.4–4)

## 2022-04-07 PROCEDURE — 36415 COLL VENOUS BLD VENIPUNCTURE: CPT

## 2022-04-07 PROCEDURE — 84443 ASSAY THYROID STIM HORMONE: CPT

## 2022-04-07 PROCEDURE — 92015 DETERMINE REFRACTIVE STATE: CPT | Performed by: OPTOMETRIST

## 2022-04-07 PROCEDURE — 80053 COMPREHEN METABOLIC PANEL: CPT

## 2022-04-07 PROCEDURE — 80061 LIPID PANEL: CPT

## 2022-04-07 PROCEDURE — 92014 COMPRE OPH EXAM EST PT 1/>: CPT | Performed by: OPTOMETRIST

## 2022-04-07 ASSESSMENT — TONOMETRY
OS_IOP_MMHG: 14
OD_IOP_MMHG: 17
IOP_METHOD: TONOPEN

## 2022-04-07 ASSESSMENT — KERATOMETRY
OS_AXISANGLE_DEGREES: 090
OS_K1POWER_DIOPTERS: 44.50
OD_AXISANGLE_DEGREES: 058
OD_K1POWER_DIOPTERS: 44.25
OS_AXISANGLE2_DEGREES: 180
OD_AXISANGLE2_DEGREES: 148
OD_K2POWER_DIOPTERS: 44.50
OS_K2POWER_DIOPTERS: 44.50

## 2022-04-07 ASSESSMENT — REFRACTION_MANIFEST
OD_CYLINDER: +0.50
OD_SPHERE: PLANO
OS_SPHERE: PLANO
OD_AXIS: 013

## 2022-04-07 ASSESSMENT — CONF VISUAL FIELD
OD_NORMAL: 1
OS_NORMAL: 1

## 2022-04-07 ASSESSMENT — EXTERNAL EXAM - LEFT EYE: OS_EXAM: NORMAL

## 2022-04-07 ASSESSMENT — VISUAL ACUITY
OS_SC+: -1
OS_SC: 20/20
OD_SC: 20/20
METHOD: SNELLEN - LINEAR
OS_SC: 20/20
OD_SC: 20/20

## 2022-04-07 ASSESSMENT — CUP TO DISC RATIO
OD_RATIO: 0.3
OS_RATIO: 0.3

## 2022-04-07 ASSESSMENT — SLIT LAMP EXAM - LIDS
COMMENTS: NORMAL
COMMENTS: NORMAL

## 2022-04-07 ASSESSMENT — EXTERNAL EXAM - RIGHT EYE: OD_EXAM: NORMAL

## 2022-04-07 NOTE — PATIENT INSTRUCTIONS
No glasses recommended.    Referral to Dr. Steve Phelan at the [facility] for evaluation due to concussion and symptoms.    Return in 1 year for a complete eye exam or sooner if needed.    Salazar Lopez, ZHENG    The affects of the dilating drops last for 4- 6 hours.  You will be more sensitive to light and vision will be blurry up close.  Do not drive if you do not feel comfortable.  Mydriatic sunglasses were given if needed.      Optometry Providers       Clinic Locations                                 Telephone Number   Dr. Kesha Cueva    Kathleen   [facility]  [place] [phone]     [place] [facility] Hours:                [place] [facility] Hours:       [facility] Hours:   [address]   [address] [address]     [address] [place] [address]   [place] [address]    [place] [address]  Phone: [phone]                    Phone: [phone]     Phone: [phone]                      Monday 8:00-6:00                          Monday 8:00-6:00                          Monday 8:00-6:00              Tuesday 8:00-6:00                          Tuesday 8:00-6:00                          Tuesday 8:00-6:00              Wednesday 8:00-6:00                  Wednesday 8:00-6:00                   Wednesday 8:00-6:00      Thursday 8:00-6:00                        Thursday 8:00-6:00                         Thursday 8:00-6:00            Friday 8:00-5:00                              Friday 8:00-5:00                              Friday 8:00-5:00    [facility] Hours:   [address] [place] [id number] [phone]    Monday 9:00-6:00  Tuesday 9:00-6:00  Wednesday 9:00-6:00  Thursday 9:00-6:00  Friday 9:00-5:00  Please log on to [email or web address] to order your contact lenses.  The link is found on the [facility] Care [facility] Vision [facility] page.  As always, Thank you for trusting us with your  health care needs!
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
## 2022-04-07 NOTE — RESULT ENCOUNTER NOTE
Dear Conor  Your thyroid function was normal.  Your electrolytes, blood sugar, kidney function and liver function were normal.    Your cholesterol was quite high.  Poor diet, genetics and being overweight can contribute to this.   Maintaining a healthy diet with lean proteins, whole grains and healthy fats such as olive oil as well as regular exercise and maintaining an appropriate weight all contribute to healthier cholesterol levels.   Let's work on diet and recheck next year.   Please call or MyChart my office with any questions or concerns.   Mimi Saldivar, PAC

## 2022-04-07 NOTE — TELEPHONE ENCOUNTER
Spoke with patient regarding scheduling for a sooner appointment from the wait-list. Rescheduled patient as offered and sent new appointment letter to confirmed address. Kept patient on the wait-list.-Per Patient

## 2022-04-29 ENCOUNTER — TELEPHONE (OUTPATIENT)
Dept: OPHTHALMOLOGY | Facility: CLINIC | Age: 32
End: 2022-04-29
Payer: COMMERCIAL

## 2022-04-29 NOTE — TELEPHONE ENCOUNTER
Spoke with patient regarding offering a sooner appointment available. Patient rescheduled to sooner date accordingly on 5/4. Appointment information is emailed to the patient, they will also see it on MyChart.

## 2022-05-03 ENCOUNTER — OFFICE VISIT (OUTPATIENT)
Dept: FAMILY MEDICINE | Facility: CLINIC | Age: 32
End: 2022-05-03
Payer: COMMERCIAL

## 2022-05-03 VITALS
RESPIRATION RATE: 18 BRPM | WEIGHT: 241.7 LBS | HEIGHT: 75 IN | DIASTOLIC BLOOD PRESSURE: 79 MMHG | OXYGEN SATURATION: 100 % | HEART RATE: 61 BPM | TEMPERATURE: 98.8 F | SYSTOLIC BLOOD PRESSURE: 113 MMHG | BODY MASS INDEX: 30.05 KG/M2

## 2022-05-03 DIAGNOSIS — F41.9 ANXIETY: Primary | ICD-10-CM

## 2022-05-03 PROCEDURE — 99213 OFFICE O/P EST LOW 20 MIN: CPT | Performed by: PHYSICIAN ASSISTANT

## 2022-05-03 ASSESSMENT — PATIENT HEALTH QUESTIONNAIRE - PHQ9
10. IF YOU CHECKED OFF ANY PROBLEMS, HOW DIFFICULT HAVE THESE PROBLEMS MADE IT FOR YOU TO DO YOUR WORK, TAKE CARE OF THINGS AT HOME, OR GET ALONG WITH OTHER PEOPLE: SOMEWHAT DIFFICULT
SUM OF ALL RESPONSES TO PHQ QUESTIONS 1-9: 4
SUM OF ALL RESPONSES TO PHQ QUESTIONS 1-9: 4

## 2022-05-03 ASSESSMENT — ANXIETY QUESTIONNAIRES
7. FEELING AFRAID AS IF SOMETHING AWFUL MIGHT HAPPEN: MORE THAN HALF THE DAYS
GAD7 TOTAL SCORE: 12
3. WORRYING TOO MUCH ABOUT DIFFERENT THINGS: MORE THAN HALF THE DAYS
6. BECOMING EASILY ANNOYED OR IRRITABLE: MORE THAN HALF THE DAYS
1. FEELING NERVOUS, ANXIOUS, OR ON EDGE: MORE THAN HALF THE DAYS
2. NOT BEING ABLE TO STOP OR CONTROL WORRYING: MORE THAN HALF THE DAYS
GAD7 TOTAL SCORE: 12
GAD7 TOTAL SCORE: 12
4. TROUBLE RELAXING: MORE THAN HALF THE DAYS
7. FEELING AFRAID AS IF SOMETHING AWFUL MIGHT HAPPEN: MORE THAN HALF THE DAYS
5. BEING SO RESTLESS THAT IT IS HARD TO SIT STILL: NOT AT ALL

## 2022-05-03 ASSESSMENT — PAIN SCALES - GENERAL: PAINLEVEL: NO PAIN (0)

## 2022-05-03 ASSESSMENT — ENCOUNTER SYMPTOMS: NERVOUS/ANXIOUS: 1
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
## 2022-05-03 NOTE — PATIENT INSTRUCTIONS
Send me a message with update from eye specialist  Consider zoloft or lexapro to help prevent anxiety

## 2022-05-04 ENCOUNTER — OFFICE VISIT (OUTPATIENT)
Dept: OPHTHALMOLOGY | Facility: CLINIC | Age: 32
End: 2022-05-04
Attending: OPTOMETRIST
Payer: COMMERCIAL

## 2022-05-04 ENCOUNTER — TELEPHONE (OUTPATIENT)
Dept: BEHAVIORAL HEALTH | Facility: CLINIC | Age: 32
End: 2022-05-04

## 2022-05-04 DIAGNOSIS — F07.81 POSTCONCUSSION SYNDROME: Primary | ICD-10-CM

## 2022-05-04 DIAGNOSIS — S09.90XS DIZZINESS DUE TO OLD HEAD INJURY: ICD-10-CM

## 2022-05-04 DIAGNOSIS — H53.143 PHOTOPHOBIA OF BOTH EYES: ICD-10-CM

## 2022-05-04 DIAGNOSIS — R42 DIZZINESS DUE TO OLD HEAD INJURY: ICD-10-CM

## 2022-05-04 PROCEDURE — 99215 OFFICE O/P EST HI 40 MIN: CPT | Performed by: OPTOMETRIST

## 2022-05-04 RX ORDER — PROPRANOLOL HYDROCHLORIDE 10 MG/1
10 TABLET ORAL PRN
COMMUNITY
End: 2022-06-23

## 2022-05-04 ASSESSMENT — TONOMETRY
OD_IOP_MMHG: 18
OS_IOP_MMHG: 16
IOP_METHOD: ICARE

## 2022-05-04 ASSESSMENT — CUP TO DISC RATIO
OD_RATIO: 0.3
OS_RATIO: 0.3

## 2022-05-04 ASSESSMENT — CONF VISUAL FIELD
METHOD: COUNTING FINGERS
OD_NORMAL: 1
OS_NORMAL: 1

## 2022-05-04 ASSESSMENT — VISUAL ACUITY
OS_SC: 20/20
METHOD: SNELLEN - LINEAR
OD_SC: 20/20

## 2022-05-04 ASSESSMENT — SLIT LAMP EXAM - LIDS
COMMENTS: NORMAL
COMMENTS: NORMAL

## 2022-05-04 ASSESSMENT — ANXIETY QUESTIONNAIRES: GAD7 TOTAL SCORE: 12

## 2022-05-04 ASSESSMENT — EXTERNAL EXAM - RIGHT EYE: OD_EXAM: NORMAL

## 2022-05-04 ASSESSMENT — EXTERNAL EXAM - LEFT EYE: OS_EXAM: NORMAL

## 2022-05-04 ASSESSMENT — PATIENT HEALTH QUESTIONNAIRE - PHQ9: SUM OF ALL RESPONSES TO PHQ QUESTIONS 1-9: 4

## 2022-05-04 NOTE — TELEPHONE ENCOUNTER
Writer spoke with pt and scheduled long term therapy for Date: Monday, 5/16/2022  Time: 11:00 am - 12:00 pm  Provider: Domenica Nesbitt  Supervised by: Jazmine ESTEVES  [facility]  Location: CARE [facility], [facility] [address]  Phone: [phone]  Type: Teletherapy      Pt stated they did not need a TC appointment as they felt comfortable and would like to cancel the 10/10 [facility] appointment as something sooner was scheduled.    Marge Gaming  05/04/22  207    ----- Message from Jesús Deluca sent at 5/4/2022  1:20 PM CDT -----  Transition Clinic Referral   [place] Only   Limited [place] Availability    Type of Referral:      __X___Therapy  _____Therapy & Medication (Psychiatry next level of care appointment needs to be scheduled)  _____Medication Only (Psychiatry next level of care appointment needs to be scheduled)  _____Diagnostic Assessment Only      Referring Provider Name: Jesús Deluca    Clinician completing the assessment. Mimi Saldivar    Referring Provider: [facility] PROVIDER    If known, referring provider contact name: Mimi Saldivar; Phone Number: unk  Service Line/Location: therapy    Reason for Transition Clinic Referral: bridge gap and clarify needs    Next Level of Care Patient Will Be Transitioned To: therapy     Start Date for Next Level of Care Therapy (Required): 10/10/22  Provider  ADULT PSYCHOTHERAPY NEW [[id number]] Copay: $0.00  Provider: Tonya House [facility] Department: [facility]  Bill Area: Psychology CS  Encounter #: [id number]        Location  see above    Start Date for Next Level of Care Medication (Required): possible pt may need CCPS as well did direct client back to PCP but he was a little  confused about the process may benefit from addressing and clarifying that process as well   Provider na  Location na  TC Psychiatry cannot see patients who do not have active medical insurance    What Would Be Helpful from the  Transition Clinic: bridge appt gap and help clarify clients needs/concerns      Needs: NO    Does Patient Have Access to Technology: yes    Patient E-mail Address: [email or web address]    Current Patient Phone Number: [phone];     Clinician Gender Preference (if applicable): cash Deluca

## 2022-05-04 NOTE — PROGRESS NOTES
Assessment/Plan  (F07.81) Postconcussion syndrome  (primary encounter diagnosis)  Comment: Injured July 2021  Plan: Discussed findings with patient. Ocular health and binocular vision appear within normal limits. Suspect that fixation instability may be playing a role in his symptoms. These symptoms will often improve for patients with gradual return to prior activities (Basketball, soccer, etc) and with visually busy activities (puzzles, word searches, playing catch with a soft ball).     (H53.143) Photophobia of both eyes  Plan: Try FL-41 tinted lenses for outdoor light sensitivity and while driving.     (R42,  S09.90XS) Dizziness due to old head injury  Plan: See above note. Consider mental health referral, as patient's symptoms sound more consistent with anxiety than vertigo.           45 minutes were spent on the date of the encounter doing chart review, history and exam, documentation, and further activities as noted above.    Complete documentation of historical and exam elements from today's encounter can  be found in the full encounter summary report (not reduplicated in this progress  note). I personally obtained the chief complaint(s) and history of present illness. I  confirmed and edited as necessary the review of systems, past medical/surgical  history, family history, social history, and examination findings as documented by  others; and I examined the patient myself. I personally reviewed the relevant tests,  images, and reports as documented above. I formulated and edited as necessary the  assessment and plan and discussed the findings and management plan with the  patient and family.    Steve Phelan OD

## 2022-05-04 NOTE — NURSING NOTE
Chief Complaints and History of Present Illnesses   Patient presents with     Consult For     Concussion 07/2021     Chief Complaint(s) and History of Present Illness(es)     Consult For     Associated symptoms: photophobia.  Negative for eye pain and headache    Pain scale: 0/10    Comments: Concussion 07/2021              Comments     Pt had concussion 07/2021, denies HA, some difficulty focusing from distance to near when its moving, not as noticeable at home. Pt notes he did not lose conciousness, having most difficulty with driving and being in public, gets tunnel vision, SOB, feels heart is racing, and hydrosis of palms.     Patient went to PT in October for vertigo, was told that there was nothing wrong.  Hasn't been to OT but has been doing eye excersizes at home, hasn't been doing as much, was doing for a month 1-2x daily, this was in the month of January.     Luciana Shrestha COT May 4, 2022 9:41 AM

## 2022-06-23 ENCOUNTER — TELEPHONE (OUTPATIENT)
Dept: NURSING | Facility: CLINIC | Age: 32
End: 2022-06-23

## 2022-06-23 DIAGNOSIS — F41.9 ANXIETY: ICD-10-CM

## 2022-06-23 RX ORDER — PROPRANOLOL HYDROCHLORIDE 10 MG/1
10 TABLET ORAL PRN
Qty: 30 TABLET | Refills: 0 | Status: SHIPPED | OUTPATIENT
Start: 2022-06-23 | End: 2022-06-30

## 2022-06-23 RX ORDER — HYDROXYZINE HYDROCHLORIDE 25 MG/1
25 TABLET, FILM COATED ORAL EVERY 6 HOURS PRN
Qty: 90 TABLET | Refills: 0 | Status: SHIPPED | OUTPATIENT
Start: 2022-06-23 | End: 2022-06-30

## 2022-06-23 NOTE — TELEPHONE ENCOUNTER
Pt is requesting a refill of both of these meds as soon as possible. Pt states he takes propranolol 10 mg as needed for anxiety related to driving and social situations and hydroxyzine for anxiety as needed every six hours also.

## 2022-06-30 ENCOUNTER — VIRTUAL VISIT (OUTPATIENT)
Dept: FAMILY MEDICINE | Facility: CLINIC | Age: 32
End: 2022-06-30
Payer: COMMERCIAL

## 2022-06-30 DIAGNOSIS — F41.9 ANXIETY: ICD-10-CM

## 2022-06-30 PROCEDURE — 99213 OFFICE O/P EST LOW 20 MIN: CPT | Mod: TEL | Performed by: PHYSICIAN ASSISTANT

## 2022-06-30 RX ORDER — PROPRANOLOL HYDROCHLORIDE 10 MG/1
10 TABLET ORAL PRN
Qty: 30 TABLET | Refills: 3 | Status: SHIPPED | OUTPATIENT
Start: 2022-06-30 | End: 2023-12-05 | Stop reason: SINTOL

## 2022-06-30 RX ORDER — HYDROXYZINE HYDROCHLORIDE 25 MG/1
25 TABLET, FILM COATED ORAL EVERY 6 HOURS PRN
Qty: 90 TABLET | Refills: 3 | Status: SHIPPED | OUTPATIENT
Start: 2022-06-30 | End: 2024-08-13

## 2022-06-30 NOTE — PROGRESS NOTES
Conor is a 32 year old who is being evaluated via a billable telephone visit.      What phone number would you like to be contacted at? [phone]  How would you like to obtain your AVS? [facility]    Assessment & Plan     Anxiety  Using hydroxyzine and propanolol as needed .  Consider zoloft or lexapro- can start one of these if desires.  Wife is a pharmacist so he would like to run by her    - hydrOXYzine (ATARAX) 25 MG tablet  Dispense: 90 tablet; Refill: 3  - propranolol (INDERAL) 10 MG tablet  Dispense: 30 tablet; Refill: 3      Prescription drug management  25 minutes spent on the date of the encounter doing chart review, history and exam, documentation and further activities per the note       Patient Instructions   Continue medications as you have been taking  Continue to work with psychotherapy  [facility] if wish to try zoloft or lexapro for symptoms - if we start one of these would want follow up with us approximately one month after starting   Follow up with us in 6 months         Return in about 6 months (around 12/30/2022), or if symptoms worsen or fail to improve, for using a video visit.    Mimi Saldivar PA-C  [facility]   Conor is a 32 year old, presenting for the following health issues:  Anxiety      HPI     Anxiety Follow-Up    How are you doing with your anxiety since your last visit? Slight improvement    Are you having other symptoms that might be associated with anxiety? No    Have you had a significant life event? No     Are you feeling depressed? No    Do you have any concerns with your use of alcohol or other drugs? No    Social History     Tobacco Use     Smoking status: Never Smoker     Smokeless tobacco: Current User     Types: Snuff   Vaping Use     Vaping Use: Never used   Substance Use Topics     Alcohol use: Yes     Comment: socially     Drug use: Not Currently     Types: Marijuana     Comment: socially     ERWIN-7 SCORE  3/17/2022 3/17/2022 5/3/2022   Total Score - 3 (minimal anxiety) 12 (moderate anxiety)   Total Score 3 3 12     PHQ 11/1/2021 3/17/2022 5/3/2022   PHQ-9 Total Score 1 2 4   Q9: Thoughts of better off dead/self-harm past 2 weeks Not at all Not at all Not at all     Last PHQ-9 5/3/2022   1.  Little interest or pleasure in doing things 2   2.  Feeling down, depressed, or hopeless 2   3.  Trouble falling or staying asleep, or sleeping too much 0   4.  Feeling tired or having little energy 0   5.  Poor appetite or overeating 0   6.  Feeling bad about yourself 0   7.  Trouble concentrating 0   8.  Moving slowly or restless 0   Q9: Thoughts of better off dead/self-harm past 2 weeks 0   PHQ-9 Total Score 4   Difficulty at work, home, or with people -     ERWIN-7  5/3/2022   1. Feeling nervous, anxious, or on edge 2   2. Not being able to stop or control worrying 2   3. Worrying too much about different things 2   4. Trouble relaxing 2   5. Being so restless that it is hard to sit still 0   6. Becoming easily annoyed or irritable 2   7. Feeling afraid, as if something awful might happen 2   ERWIN-7 Total Score 12   If you checked any problems, how difficult have they made it for you to do your work, take care of things at home, or get along with other people? -         How many servings of fruits and vegetables do you eat daily?  0-1    On average, how many sweetened beverages do you drink each day (Examples: soda, juice, sweet tea, etc.  Do NOT count diet or artificially sweetened beverages)?   0    How many days per week do you exercise enough to make your heart beat faster? 3 or less    How many minutes a day do you exercise enough to make your heart beat faster? 20 - 29    How many days per week do you miss taking your medication? 0  Patient known to me with history of concussion and anxiety  [place]  Bachelor party coming up- luckily only hour away   Couple weddings in august and October  Last  year attended 14 weddings  Has seen ophthamologist and Based off what I was telling him- posttraumatic concussion syndrome but a lot of things I am experiencing are related to anxiety  Starting psychotherapy every Wednesday last couple weeks  Trying to go out and do more things - if going out hydroxyzine more helpful  Dependent on situations- if out for long time takes both hydroxyzine and propanolol   Would like to see if therapy will be helpful   Hydroxyzine helps but still has a lot of symptoms .  Per recommendations is   Pushing self to drive but not driving on freeway much. Has driven 2 1/2 hours still feel something off-more visual than anything- feels like looking down tunnel vision  On med doesn't get sweaty palms as much   Friend of friend- had a whiplash concussion last year- saw all same doctors and did eye exercises-he had every symptom that I had and did Eye exercises for 2 months 2-3 times a day-and now pretty close to 100% resolved.       Review of Systems   Constitutional, HEENT, cardiovascular, pulmonary, gi and gu systems are negative, except as otherwise noted.      Objective    Vitals - Patient Reported  Pain Score: No Pain (0)        Physical Exam   healthy, alert and no distress  PSYCH: Alert and oriented times 3; coherent speech, normal   rate and volume, able to articulate logical thoughts, able   to abstract reason, no tangential thoughts, no hallucinations   or delusions  His affect is normal and pleasant  RESP: No cough, no audible wheezing, able to talk in full sentences  Remainder of exam unable to be completed due to telephone visits                Phone call duration: 13 minutes    .  ..

## 2022-06-30 NOTE — PATIENT INSTRUCTIONS
Continue medications as you have been taking  Continue to work with psychotherapy  Let us know via [facility] if wish to try zoloft or lexapro for symptoms - if we start one of these would want follow up with us approximately one month after starting   Follow up with us in 6 months

## 2022-10-03 ENCOUNTER — HEALTH MAINTENANCE LETTER (OUTPATIENT)
Age: 32
End: 2022-10-03

## 2022-10-10 ENCOUNTER — VIRTUAL VISIT (OUTPATIENT)
Dept: PSYCHOLOGY | Facility: CLINIC | Age: 32
End: 2022-10-10
Attending: PHYSICIAN ASSISTANT
Payer: COMMERCIAL

## 2022-10-10 DIAGNOSIS — F41.1 GAD (GENERALIZED ANXIETY DISORDER): Primary | ICD-10-CM

## 2022-10-10 PROCEDURE — 90791 PSYCH DIAGNOSTIC EVALUATION: CPT | Mod: GT

## 2022-10-10 ASSESSMENT — ANXIETY QUESTIONNAIRES
6. BECOMING EASILY ANNOYED OR IRRITABLE: SEVERAL DAYS
7. FEELING AFRAID AS IF SOMETHING AWFUL MIGHT HAPPEN: MORE THAN HALF THE DAYS
3. WORRYING TOO MUCH ABOUT DIFFERENT THINGS: MORE THAN HALF THE DAYS
GAD7 TOTAL SCORE: 10
2. NOT BEING ABLE TO STOP OR CONTROL WORRYING: NEARLY EVERY DAY
7. FEELING AFRAID AS IF SOMETHING AWFUL MIGHT HAPPEN: MORE THAN HALF THE DAYS
GAD7 TOTAL SCORE: 10
1. FEELING NERVOUS, ANXIOUS, OR ON EDGE: MORE THAN HALF THE DAYS
GAD7 TOTAL SCORE: 10
8. IF YOU CHECKED OFF ANY PROBLEMS, HOW DIFFICULT HAVE THESE MADE IT FOR YOU TO DO YOUR WORK, TAKE CARE OF THINGS AT HOME, OR GET ALONG WITH OTHER PEOPLE?: NOT DIFFICULT AT ALL
5. BEING SO RESTLESS THAT IT IS HARD TO SIT STILL: NOT AT ALL
IF YOU CHECKED OFF ANY PROBLEMS ON THIS QUESTIONNAIRE, HOW DIFFICULT HAVE THESE PROBLEMS MADE IT FOR YOU TO DO YOUR WORK, TAKE CARE OF THINGS AT HOME, OR GET ALONG WITH OTHER PEOPLE: NOT DIFFICULT AT ALL
4. TROUBLE RELAXING: NOT AT ALL

## 2022-10-10 ASSESSMENT — COLUMBIA-SUICIDE SEVERITY RATING SCALE - C-SSRS
6. HAVE YOU EVER DONE ANYTHING, STARTED TO DO ANYTHING, OR PREPARED TO DO ANYTHING TO END YOUR LIFE?: NO
3. HAVE YOU BEEN THINKING ABOUT HOW YOU MIGHT KILL YOURSELF?: NO
5. HAVE YOU STARTED TO WORK OUT OR WORKED OUT THE DETAILS OF HOW TO KILL YOURSELF? DO YOU INTEND TO CARRY OUT THIS PLAN?: NO
4. HAVE YOU HAD THESE THOUGHTS AND HAD SOME INTENTION OF ACTING ON THEM?: NO
2. HAVE YOU ACTUALLY HAD ANY THOUGHTS OF KILLING YOURSELF IN THE PAST MONTH?: NO
1. IN THE PAST MONTH, HAVE YOU WISHED YOU WERE DEAD OR WISHED YOU COULD GO TO SLEEP AND NOT WAKE UP?: YES
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
## 2022-11-09 ENCOUNTER — VIRTUAL VISIT (OUTPATIENT)
Dept: PSYCHOLOGY | Facility: CLINIC | Age: 32
End: 2022-11-09
Payer: COMMERCIAL

## 2022-11-09 DIAGNOSIS — F41.1 GAD (GENERALIZED ANXIETY DISORDER): Primary | ICD-10-CM

## 2022-11-09 PROCEDURE — 90834 PSYTX W PT 45 MINUTES: CPT | Mod: GT

## 2022-11-09 NOTE — PROGRESS NOTES
[facility]                                     Progress Note    Patient Name: Conor Matthew  Date: 11/9/22         Service Type: Individual      Session Start Time: 3:00 pm  Session End Time: 3:45 pm     Session Length: 45 min    Session #: 2    Attendees: Client attended alone    Service Modality:  Video Visit:      Provider verified identity through the following two step process.  Patient provided:  Patient is known previously to provider    Telemedicine Visit: The patient's condition can be safely assessed and treated via synchronous audio and visual telemedicine encounter.      Reason for Telemedicine Visit: Patient convenience (e.g. access to timely appointments / distance to available provider)    Originating Site (Patient Location): Patient's home    Distant Site (Provider Location): Provider Remote Setting- Home Office    Consent:  The patient/guardian has verbally consented to: the potential risks and benefits of telemedicine (video visit) versus in person care; bill my insurance or make self-payment for services provided; and responsibility for payment of non-covered services.     Patient would like the video invitation sent by:  My Chart    Mode of Communication:  Video Conference via Amwell    Distant Location (Provider):  Off-site    As the provider I attest to compliance with applicable laws and regulations related to telemedicine.    DATA  Interactive Complexity: No  Crisis: No      Progress Since Last Session (Related to Symptoms / Goals / Homework):   Symptoms: Improving anxiety    Homework: has made behavioral changes      Episode of Care Goals: Minimal progress - CONTEMPLATION (Considering change and yet undecided); Intervened by assessing the negative and positive thinking (ambivalence) about behavior change     Current / Ongoing Stressors and Concerns:  Current: Following wedding season has stopped drinking, returned to exercise-working out daily with a friend at the gym.  Focusing on changing sleep schedule to accommodate am workouts.  Brother substance abuse problems, parentification, need for boundaries.      Treatment Objective(s) Addressed in This Session:   Exercise consistently and manage sleep hours  Find replacement for alcohol, maintain sobriety     Intervention:   Motivational Interviewing: :   Motivational Interviewing  Target Behavior: alcohol use and exercise, nutrition    Stage of Change: ACTION (Actively working towards change)    MI Intervention: Expressed Empathy/Understanding, Supported Autonomy, Collaboration, Evocation, Open-ended questions and Reflections: simple and complex     Change Talk Expressed by the Patient: Desire to change Reasons to change Activation    Provider Response to Change Talk: E - Evoked more info from patient about behavior change and A - Affirmed patient's thoughts, decisions, or attempts at behavior change    Solution focused:  Provided active listening and validation; shared CODA resources  Assessments completed prior to visit:  The following assessments were completed by patient for this visit:  PHQ2:   PHQ-2 ( 1999 [employer]) 10/10/2022 5/3/2022 5/3/2022 3/17/2022 3/17/2022 11/1/2021 11/1/2021   Q1: Little interest or pleasure in doing things 1 2 - 1 1 3 3   Q2: Feeling down, depressed or hopeless 1 2 - 0 1 0 0   PHQ-2 Score 2 4 - 1 2 3 3   PHQ-2 Total Score (12-17 Years)- Positive if 3 or more points; Administer PHQ-A if positive - - - - - 3 3   Q1: Little interest or pleasure in doing things Several days More than half the days - Several days - Nearly every day -   Q2: Feeling down, depressed or hopeless Several days More than half the days - Not at all - Not at all -   PHQ-2 Score 2 Incomplete 4 Incomplete 1 Incomplete 3     PHQ9:   PHQ-9 SCORE 11/1/2021 11/1/2021 3/17/2022 5/3/2022   PHQ-9 Total Score MyChart - 1 (Minimal depression) - 4 (Minimal depression)   PHQ-9 Total Score 1 1 2 4     GAD2:   ERWIN-2 10/10/2022   Feeling nervous,  anxious, or on edge 2   Not being able to stop or control worrying 3   ERWIN-2 Total Score 5     GAD7:   ERWIN-7 SCORE 11/1/2021 11/1/2021 3/17/2022 3/17/2022 5/3/2022 10/10/2022   Total Score - 10 (moderate anxiety) - 3 (minimal anxiety) 12 (moderate anxiety) 10 (moderate anxiety)   Total Score 10 10 3 3 12 10     CAGE-AID:   CAGE-AID Total Score 10/10/2022   Total Score 1   Total Score MyChart 1 (A total score of 2 or greater is considered clinically significant)     PROMIS 10-Global Health (only subscores and total score):   PROMIS-10 Scores Only 10/10/2022   Global Mental Health Score 8   Global Physical Health Score 9   PROMIS TOTAL - SUBSCORES 17     [place] Suicide Severity Rating Scale (Lifetime/Recent)  [place] Suicide Severity Rating (Lifetime/Recent) 10/10/2022   Q1 Wished to be Dead (Past Month) yes   Q2 Suicidal Thoughts (Past Month) no   Q3 Suicidal Thought Method no   Q4 Suicidal Intent without Specific Plan no   Q5 Suicide Intent with Specific Plan no   Q6 Suicide Behavior (Lifetime) no   Level of Risk per Screen low risk         ASSESSMENT: Current Emotional / Mental Status (status of significant symptoms):   Risk status (Self / Other harm or suicidal ideation)   Patient denies current fears or concerns for personal safety.   Patient denies current or recent suicidal ideation or behaviors.   Patient denies current or recent homicidal ideation or behaviors.   Patient denies current or recent self injurious behavior or ideation.   Patient denies other safety concerns.   Patient reports there has been no change in risk factors since their last session.     Patient reports there has been no change in protective factors since their last session.     Recommended that patient call 911 or go to the local ED should there be a change in any of these risk factors.     Appearance:   Appropriate    Eye Contact:   Good    Psychomotor Behavior: Normal    Attitude:   Cooperative   Pleasant   Orientation:   All   Speech    Rate / Production: Normal     Volume:  Normal    Mood:    Anxious    Affect:    Appropriate    Thought Content:  Clear    Thought Form:  Coherent  Goal Directed  Logical    Insight:    Intellectual Insight     Medication Review:   No changes to current psychiatric medication(s)     Medication Compliance:   Yes     Changes in Health Issues:   None reported     Chemical Use Review:   Substance Use: decrease in alcohol .  Patient reports frequency of use has stopped drinking.  Provided encouragement towards sobriety        Tobacco Use: No change in amount of tobacco use since last session.  Patient declined discussion at this time    Diagnosis:  1. ERWIN (generalized anxiety disorder)        Collateral Reports Completed:   Not Applicable    PLAN: (Patient Tasks / Therapist Tasks / Other)  Continue exercise, sleep, abstinence. Consider Ramonita GRIFFITH No more.        Tonya House, LICSW  11/9/2022                                                         ______________________________________________________________________

## 2022-11-22 ENCOUNTER — VIRTUAL VISIT (OUTPATIENT)
Dept: PSYCHOLOGY | Facility: CLINIC | Age: 32
End: 2022-11-22
Payer: COMMERCIAL

## 2022-11-22 DIAGNOSIS — F41.1 GAD (GENERALIZED ANXIETY DISORDER): Primary | ICD-10-CM

## 2022-11-22 PROCEDURE — 90834 PSYTX W PT 45 MINUTES: CPT | Mod: GT

## 2022-11-22 NOTE — PROGRESS NOTES
[facility]                                     Progress Note    Patient Name: Conor Matthew  Date: 11/22/22         Service Type: Individual      Session Start Time: 2:10 pm  Session End Time: 2:50 pm     Session Length: 40 min    Session #: 3    Attendees: Client attended alone    Service Modality:  Video Visit:      Provider verified identity through the following two step process.  Patient provided:  Patient is known previously to provider    Telemedicine Visit: The patient's condition can be safely assessed and treated via synchronous audio and visual telemedicine encounter.      Reason for Telemedicine Visit: Patient convenience (e.g. access to timely appointments / distance to available provider)    Originating Site (Patient Location): Patient's home    Distant Site (Provider Location): Provider Remote Setting- Home Office    Consent:  The patient/guardian has verbally consented to: the potential risks and benefits of telemedicine (video visit) versus in person care; bill my insurance or make self-payment for services provided; and responsibility for payment of non-covered services.     Patient would like the video invitation sent by:  My Chart    Mode of Communication:  Video Conference via Amwell    Distant Location (Provider):  Off-site    As the provider I attest to compliance with applicable laws and regulations related to telemedicine.    DATA  Interactive Complexity: No  Crisis: No      Progress Since Last Session (Related to Symptoms / Goals / Homework):   Symptoms: Improving anxiety    Homework: has made behavioral changes      Episode of Care Goals: Minimal progress - CONTEMPLATION (Considering change and yet undecided); Intervened by assessing the negative and positive thinking (ambivalence) about behavior change     Current / Ongoing Stressors and Concerns:  Current: Preparing for highway travel to [place] for Thanksgiving and sober holiday. Concerned about vision tracking-seeking  support to address it. 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 Focusing on changing sleep schedule to accommodate am workouts.  Brother substance abuse problems, parentification, need for boundaries.      Treatment Objective(s) Addressed in This Session:   Exercise consistently and manage sleep hours  Find replacement for alcohol, maintain sobriety  Use boundaries in relationships     Intervention:   Motivational Interviewing: :   Motivational Interviewing  Target Behavior: alcohol use and exercise, nutrition    Stage of Change: ACTION (Actively working towards change)    MI Intervention: Expressed Empathy/Understanding, Supported Autonomy, Collaboration, Evocation, Open-ended questions and Reflections: simple and complex     Change Talk Expressed by the Patient: Desire to change Reasons to change Activation    Provider Response to Change Talk: E - Evoked more info from patient about behavior change and A - Affirmed patient's thoughts, decisions, or attempts at behavior change    Solution focused:  Provided active listening and validation; provided education on types of boundaries  Assessments completed prior to visit:  The following assessments were completed by patient for this visit:  PHQ2:   PHQ-2 ( 1999 [employer]) 10/10/2022 5/3/2022 5/3/2022 3/17/2022 3/17/2022 11/1/2021 11/1/2021   Q1: Little interest or pleasure in doing things 1 2 - 1 1 3 3   Q2: Feeling down, depressed or hopeless 1 2 - 0 1 0 0   PHQ-2 Score 2 4 - 1 2 3 3   PHQ-2 Total Score (12-17 Years)- Positive if 3 or more points; Administer PHQ-A if positive - - - - - 3 3   Q1: Little interest or pleasure in doing things Several days More than half the days - Several days - Nearly every day -   Q2: Feeling down, depressed or hopeless Several days More than half the days - Not at all - Not at all -   PHQ-2 Score 2 Incomplete 4 Incomplete  1 Incomplete 3     PHQ9:   PHQ-9 SCORE 11/1/2021 11/1/2021 3/17/2022 5/3/2022   PHQ-9 Total Score MyChart - 1 (Minimal depression) - 4 (Minimal depression)   PHQ-9 Total Score 1 1 2 4     GAD2:   ERWIN-2 10/10/2022   Feeling nervous, anxious, or on edge 2   Not being able to stop or control worrying 3   ERWIN-2 Total Score 5     GAD7:   ERWIN-7 SCORE 11/1/2021 11/1/2021 3/17/2022 3/17/2022 5/3/2022 10/10/2022   Total Score - 10 (moderate anxiety) - 3 (minimal anxiety) 12 (moderate anxiety) 10 (moderate anxiety)   Total Score 10 10 3 3 12 10     CAGE-AID:   CAGE-AID Total Score 10/10/2022   Total Score 1   Total Score MyChart 1 (A total score of 2 or greater is considered clinically significant)     PROMIS 10-Global Health (only subscores and total score):   PROMIS-10 Scores Only 10/10/2022   Global Mental Health Score 8   Global Physical Health Score 9   PROMIS TOTAL - SUBSCORES 17     [place] Suicide Severity Rating Scale (Lifetime/Recent)  [place] Suicide Severity Rating (Lifetime/Recent) 10/10/2022   Q1 Wished to be Dead (Past Month) yes   Q2 Suicidal Thoughts (Past Month) no   Q3 Suicidal Thought Method no   Q4 Suicidal Intent without Specific Plan no   Q5 Suicide Intent with Specific Plan no   Q6 Suicide Behavior (Lifetime) no   Level of Risk per Screen low risk         ASSESSMENT: Current Emotional / Mental Status (status of significant symptoms):   Risk status (Self / Other harm or suicidal ideation)   Patient denies current fears or concerns for personal safety.   Patient denies current or recent suicidal ideation or behaviors.   Patient denies current or recent homicidal ideation or behaviors.   Patient denies current or recent self injurious behavior or ideation.   Patient denies other safety concerns.   Patient reports there has been no change in risk factors since their last session.     Patient reports there has been no change in protective factors since their last session.     Recommended that patient call  911 or go to the local ED should there be a change in any of these risk factors.     Appearance:   Appropriate    Eye Contact:   Good    Psychomotor Behavior: Normal    Attitude:   Cooperative  Pleasant   Orientation:   All   Speech    Rate / Production: Normal     Volume:  Normal    Mood:    Anxious    Affect:    Appropriate    Thought Content:  Clear    Thought Form:  Coherent  Goal Directed  Logical    Insight:    Intellectual Insight     Medication Review:   No changes to current psychiatric medication(s)     Medication Compliance:   Yes     Changes in Health Issues:   None reported     Chemical Use Review:   Substance Use: decrease in alcohol .  Patient reports frequency of use has stopped drinking.  Provided encouragement towards sobriety        Tobacco Use: No change in amount of tobacco use since last session.  Patient declined discussion at this time    Diagnosis:  1. ERWIN (generalized anxiety disorder)        Collateral Reports Completed:   Not Applicable    PLAN: (Patient Tasks / Therapist Tasks / Other)  Continue exercise, sleep, abstinence. Consider Ramonita GRIFFITH No more and boundaries        Tonya House, LICSW  11/22/2022                                                         ______________________________________________________________________

## 2022-12-12 ENCOUNTER — VIRTUAL VISIT (OUTPATIENT)
Dept: PSYCHOLOGY | Facility: CLINIC | Age: 32
End: 2022-12-12
Payer: COMMERCIAL

## 2022-12-12 DIAGNOSIS — F41.1 GAD (GENERALIZED ANXIETY DISORDER): Primary | ICD-10-CM

## 2022-12-12 PROCEDURE — 90834 PSYTX W PT 45 MINUTES: CPT | Mod: GT

## 2022-12-12 NOTE — PROGRESS NOTES
[facility]                                     Progress Note    Patient Name: Conor Matthew  Date: 12/12/22         Service Type: Individual      Session Start Time: 2:05 pm  Session End Time: 2:45 pm     Session Length: 40 min    Session #: 4    Attendees: Client attended alone    Service Modality:  Video Visit:      Provider verified identity through the following two step process.  Patient provided:  Patient is known previously to provider    Telemedicine Visit: The patient's condition can be safely assessed and treated via synchronous audio and visual telemedicine encounter.      Reason for Telemedicine Visit: Patient convenience (e.g. access to timely appointments / distance to available provider)    Originating Site (Patient Location): Patient's home    Distant Site (Provider Location): Provider Remote Setting- Home Office    Consent:  The patient/guardian has verbally consented to: the potential risks and benefits of telemedicine (video visit) versus in person care; bill my insurance or make self-payment for services provided; and responsibility for payment of non-covered services.     Patient would like the video invitation sent by:  My Chart    Mode of Communication:  Video Conference via Amwell    Distant Location (Provider):  Off-site    As the provider I attest to compliance with applicable laws and regulations related to telemedicine.    DATA  Interactive Complexity: No  Crisis: No      Progress Since Last Session (Related to Symptoms / Goals / Homework):   Symptoms: Improving anxiety    Homework: Progress has made behavioral changes      Episode of Care Goals: Minimal progress - ACTION (Actively working towards change); Intervened by reinforcing change plan / affirming steps taken     Current / Ongoing Stressors and Concerns:  Current: Returning to [place] to support grandmother's surgery. Preparing for highway travel to [place] for Thanksgiving and sober holiday. Concerned about vision  tracking-seeking support to address it. 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 Focusing on changing sleep schedule to accommodate am workouts.  Brother substance abuse problems, parentification, need for boundaries.      Treatment Objective(s) Addressed in This Session:   Exercise consistently and manage sleep hours  Find replacement for alcohol, maintain sobriety  Use boundaries in relationships     Intervention:   Motivational Interviewing: :   Motivational Interviewing  Target Behavior: alcohol use and exercise, nutrition, nicotine sachet use    Stage of Change: ACTION (Actively working towards change)    MI Intervention: Expressed Empathy/Understanding, Supported Autonomy, Collaboration, Evocation, Open-ended questions, Reflections: simple and complex and Change talk (evoked)     Change Talk Expressed by the Patient: Desire to change Ability to change Reasons to change Need to change Activation Taking steps    Provider Response to Change Talk: E - Evoked more info from patient about behavior change, A - Affirmed patient's thoughts, decisions, or attempts at behavior change, R - Reflected patient's change talk and S - Summarized patient's change talk statements    Solution focused:  Provided active listening and validation. Surfaced need for proactive communication in relationships    Assessments completed prior to visit:  The following assessments were completed by patient for this visit:  PHQ2:   PHQ-2 ( 1999 [employer]) 10/10/2022 5/3/2022 5/3/2022 3/17/2022 3/17/2022 11/1/2021 11/1/2021   Q1: Little interest or pleasure in doing things 1 2 - 1 1 3 3   Q2: Feeling down, depressed or hopeless 1 2 - 0 1 0 0   PHQ-2 Score 2 4 - 1 2 3 3   PHQ-2 Total Score (12-17 Years)- Positive if 3 or more points; Administer PHQ-A if positive - - - - - 3 3   Q1: Little interest or pleasure in doing things Several  days More than half the days - Several days - Nearly every day -   Q2: Feeling down, depressed or hopeless Several days More than half the days - Not at all - Not at all -   PHQ-2 Score 2 Incomplete 4 Incomplete 1 Incomplete 3     PHQ9:   PHQ-9 SCORE 11/1/2021 11/1/2021 3/17/2022 5/3/2022   PHQ-9 Total Score MyChart - 1 (Minimal depression) - 4 (Minimal depression)   PHQ-9 Total Score 1 1 2 4     GAD2:   ERWIN-2 10/10/2022   Feeling nervous, anxious, or on edge 2   Not being able to stop or control worrying 3   ERWIN-2 Total Score 5     GAD7:   ERWIN-7 SCORE 11/1/2021 11/1/2021 3/17/2022 3/17/2022 5/3/2022 10/10/2022   Total Score - 10 (moderate anxiety) - 3 (minimal anxiety) 12 (moderate anxiety) 10 (moderate anxiety)   Total Score 10 10 3 3 12 10     CAGE-AID:   CAGE-AID Total Score 10/10/2022   Total Score 1   Total Score MyChart 1 (A total score of 2 or greater is considered clinically significant)     PROMIS 10-Global Health (only subscores and total score):   PROMIS-10 Scores Only 10/10/2022   Global Mental Health Score 8   Global Physical Health Score 9   PROMIS TOTAL - SUBSCORES 17     [place] Suicide Severity Rating Scale (Lifetime/Recent)  [place] Suicide Severity Rating (Lifetime/Recent) 10/10/2022   Q1 Wished to be Dead (Past Month) yes   Q2 Suicidal Thoughts (Past Month) no   Q3 Suicidal Thought Method no   Q4 Suicidal Intent without Specific Plan no   Q5 Suicide Intent with Specific Plan no   Q6 Suicide Behavior (Lifetime) no   Level of Risk per Screen low risk         ASSESSMENT: Current Emotional / Mental Status (status of significant symptoms):   Risk status (Self / Other harm or suicidal ideation)   Patient denies current fears or concerns for personal safety.   Patient denies current or recent suicidal ideation or behaviors.   Patient denies current or recent homicidal ideation or behaviors.   Patient denies current or recent self injurious behavior or ideation.   Patient denies other safety  concerns.   Patient reports there has been no change in risk factors since their last session.     Patient reports there has been no change in protective factors since their last session.     Recommended that patient call 911 or go to the local ED should there be a change in any of these risk factors.     Appearance:   Appropriate    Eye Contact:   Good    Psychomotor Behavior: Normal    Attitude:   Cooperative  Pleasant   Orientation:   All   Speech    Rate / Production: Normal     Volume:  Normal    Mood:    Anxious    Affect:    Appropriate    Thought Content:  Clear    Thought Form:  Coherent  Goal Directed  Logical    Insight:    Intellectual Insight     Medication Review:   No changes to current psychiatric medication(s)     Medication Compliance:   Yes     Changes in Health Issues:   None reported     Chemical Use Review:   Substance Use: decrease in alcohol .  Patient reports frequency of use has stopped drinking.  Provided encouragement towards sobriety        Tobacco Use: No change in amount of tobacco use since last session.  Patient declined discussion at this time    Diagnosis:  1. ERWIN (generalized anxiety disorder)        Collateral Reports Completed:   Not Applicable    PLAN: (Patient Tasks / Therapist Tasks / Other)  Continue exercise, sleep, abstinence. Consider CODA, CoDependent No more and boundaries. Eliminate nicotine pouches        NEENA Hernandez  12/12/2022                                                Individual Treatment Plan    Patient's Name: Conor Matthew  YOB: 1990    Date of Creation: 12/12/22  Date Treatment Plan Last Reviewed/Revised:     DSM5 Diagnoses: 300.02 (F41.1) Generalized Anxiety Disorder  Psychosocial / Contextual Factors: hx of trauma, substance abuse  PROMIS (reviewed every 90 days): 21  (10/10/22)    Referral / Collaboration:  Referral to another professional/service is not indicated at this time..    Anticipated number of session for this episode of  care: 9-12 sessions  Anticipation frequency of session: Every other week  Anticipated Duration of each session: 38-52 minutes  Treatment plan will be reviewed in 90 days or when goals have been changed.       MeasurableTreatment Goal(s) related to diagnosis / functional impairment(s)  Goal 1: Patient will experience a reduction in anxiety and panic and improvement in functioning.    I will know I've met my goal when I'm feeling better overall (paraphrase).      Objective #A (Patient Action)    Patient will address substance abuse, return to exercise, sleep consistency, and  health practices to support improved functioning.  Status: New - Date: 12/12/22     Intervention(s)  Therapist will teach MI, behavioral activation and assign homework/accountability.    Objective #B  Patient will identify at least 5 triggers for anxiety and practice skills to address anxiety  Patient will use mindfulness, grounding and proactive communication skills, emotion recognition/regulation skills  Status: New - Date: 12/12/22     Intervention(s)  Therapist will teach CBT skills and mindfulness-grounding skills.    Objective #C  Patient will practice exposure to driving on freeways to reduce anxiety/panic.  Status: New - Date: 12/12/22     Intervention(s)  Therapist will assign homework and track progress.      Patient has reviewed and agreed to the above plan.      Tonya House, [facility] CareSW  December 12, 2022                                                           ______________________________________________________________________

## 2023-01-03 ENCOUNTER — VIRTUAL VISIT (OUTPATIENT)
Dept: PSYCHOLOGY | Facility: CLINIC | Age: 33
End: 2023-01-03
Payer: COMMERCIAL

## 2023-01-03 DIAGNOSIS — F41.1 GAD (GENERALIZED ANXIETY DISORDER): Primary | ICD-10-CM

## 2023-01-03 PROCEDURE — 90834 PSYTX W PT 45 MINUTES: CPT | Mod: GT

## 2023-01-03 NOTE — PROGRESS NOTES
[facility]                                     Progress Note    Patient Name: Conor Matthew  Date: 1/3/23         Service Type: Individual      Session Start Time: 2:00 pm  Session End Time: 2:45 pm     Session Length: 45 min    Session #: 5    Attendees: Client attended alone    Service Modality:  Video Visit:      Provider verified identity through the following two step process.  Patient provided:  Patient is known previously to provider    Telemedicine Visit: The patient's condition can be safely assessed and treated via synchronous audio and visual telemedicine encounter.      Reason for Telemedicine Visit: Patient convenience (e.g. access to timely appointments / distance to available provider)    Originating Site (Patient Location): Patient's home    Distant Site (Provider Location): Provider Remote Setting- Home Office    Consent:  The patient/guardian has verbally consented to: the potential risks and benefits of telemedicine (video visit) versus in person care; bill my insurance or make self-payment for services provided; and responsibility for payment of non-covered services.     Patient would like the video invitation sent by:  My Chart    Mode of Communication:  Video Conference via Amwell    Distant Location (Provider):  Off-site    As the provider I attest to compliance with applicable laws and regulations related to telemedicine.    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 Relational stressor with wife conflict, lack of connection/emotinoal support. Focusing on changing sleep schedule to accommodate am workouts.  Brother substance abuse problems, parentification, need for boundaries.      Treatment Objective(s) Addressed in This Session:   Exercise consistently and manage sleep hours  Find replacement for alcohol, maintain sobriety, pursue nicotine sachet elimination  Use boundaries and communication  in relationships     Intervention:   Motivational Interviewing: :   Motivational Interviewing  Target Behavior: alcohol use and exercise, nutrition, nicotine sachet use, mindful communication with wife    Stage of Change: ACTION (Actively working towards change)    MI Intervention: Expressed Empathy/Understanding, Supported Autonomy, Collaboration, Evocation, Open-ended questions, Reflections: simple and complex and Change talk (evoked)     Change Talk Expressed by the Patient: Desire to change Ability to change Reasons to change Need to change Activation Taking steps    Provider Response to Change Talk: E - Evoked more info from patient about behavior change, A - Affirmed patient's thoughts, decisions, or attempts at behavior change, R - Reflected patient's change talk and S - Summarized patient's change talk statements    Solution focused:  Provided active listening and validation. Surfaced need for proactive communication in relationship    Assessments completed prior to visit:  The following assessments were completed by patient for this visit:  PHQ2:   PHQ-2 ( 1999 [employer]) 10/10/2022 5/3/2022 5/3/2022 3/17/2022 3/17/2022 11/1/2021 11/1/2021   Q1: Little interest or pleasure in doing things 1 2 - 1 1 3 3   Q2: Feeling down, depressed or hopeless 1 2 - 0 1 0 0   PHQ-2 Score 2 4 - 1 2 3 3   PHQ-2 Total Score (12-17 Years)- Positive if 3 or more points; Administer PHQ-A if positive - - - - - 3 3   Q1: Little interest or pleasure in doing things Several days More than half the  days - Several days - Nearly every day -   Q2: Feeling down, depressed or hopeless Several days More than half the days - Not at all - Not at all -   PHQ-2 Score 2 Incomplete 4 Incomplete 1 Incomplete 3     PHQ9:   PHQ-9 SCORE 11/1/2021 11/1/2021 3/17/2022 5/3/2022   PHQ-9 Total Score MyChart - 1 (Minimal depression) - 4 (Minimal depression)   PHQ-9 Total Score 1 1 2 4     GAD2:   ERWIN-2 10/10/2022   Feeling nervous, anxious, or on edge 2   Not being able to stop or control worrying 3   ERWIN-2 Total Score 5     GAD7:   ERWIN-7 SCORE 11/1/2021 11/1/2021 3/17/2022 3/17/2022 5/3/2022 10/10/2022   Total Score - 10 (moderate anxiety) - 3 (minimal anxiety) 12 (moderate anxiety) 10 (moderate anxiety)   Total Score 10 10 3 3 12 10     CAGE-AID:   CAGE-AID Total Score 10/10/2022   Total Score 1   Total Score MyChart 1 (A total score of 2 or greater is considered clinically significant)     PROMIS 10-Global Health (only subscores and total score):   PROMIS-10 Scores Only 10/10/2022   Global Mental Health Score 8   Global Physical Health Score 9   PROMIS TOTAL - SUBSCORES 17     [place] Suicide Severity Rating Scale (Lifetime/Recent)  [place] Suicide Severity Rating (Lifetime/Recent) 10/10/2022   Q1 Wished to be Dead (Past Month) yes   Q2 Suicidal Thoughts (Past Month) no   Q3 Suicidal Thought Method no   Q4 Suicidal Intent without Specific Plan no   Q5 Suicide Intent with Specific Plan no   Q6 Suicide Behavior (Lifetime) no   Level of Risk per Screen low risk         ASSESSMENT: Current Emotional / Mental Status (status of significant symptoms):   Risk status (Self / Other harm or suicidal ideation)   Patient denies current fears or concerns for personal safety.   Patient denies current or recent suicidal ideation or behaviors.   Patient denies current or recent homicidal ideation or behaviors.   Patient denies current or recent self injurious behavior or ideation.   Patient denies other safety concerns.   Patient reports there  has been no change in risk factors since their last session.     Patient reports there has been no change in protective factors since their last session.     Recommended that patient call 911 or go to the local ED should there be a change in any of these risk factors.     Appearance:   Appropriate    Eye Contact:   Good    Psychomotor Behavior: Normal    Attitude:   Cooperative  Pleasant   Orientation:   All   Speech    Rate / Production: Normal     Volume:  Normal    Mood:    Anxious    Affect:    Appropriate    Thought Content:  Clear    Thought Form:  Coherent  Goal Directed  Logical    Insight:    Intellectual Insight     Medication Review:   No changes to current psychiatric medication(s)     Medication Compliance:   Yes     Changes in Health Issues:   None reported     Chemical Use Review:   Substance Use: decrease in alcohol .  Patient reports frequency of use has stopped drinking.  Provided encouragement towards sobriety        Tobacco Use: No change in amount of tobacco use since last session.  Patient declined discussion at this time    Diagnosis:  1. ERWIN (generalized anxiety disorder)      Collateral Reports Completed:   Not Applicable    PLAN: (Patient Tasks / Therapist Tasks / Other)  Continue exercise, sleep, abstinence. Use mindful communication to get emotional needs met. Work to Eliminate nicotine pouches.     Tonya House, [facility]  1/3/2023                                                Individual Treatment Plan    Patient's Name: Conor Matthew  YOB: 1990    Date of Creation: 12/12/22  Date Treatment Plan Last Reviewed/Revised:     DSM5 Diagnoses: 300.02 (F41.1) Generalized Anxiety Disorder  Psychosocial / Contextual Factors: hx of trauma, substance abuse  PROMIS (reviewed every 90 days): 21  (10/10/22)    Referral / Collaboration:  Referral to another professional/service is not indicated at this time..    Anticipated number of session for this episode of care: 9-12  sessions  Anticipation frequency of session: Every other week  Anticipated Duration of each session: 38-52 minutes  Treatment plan will be reviewed in 90 days or when goals have been changed.       MeasurableTreatment Goal(s) related to diagnosis / functional impairment(s)  Goal 1: Patient will experience a reduction in anxiety and panic and improvement in functioning.    I will know I've met my goal when I'm feeling better overall (paraphrase).      Objective #A (Patient Action)    Patient will address substance abuse, return to exercise, sleep consistency, and  health practices to support improved functioning.  Status: New - Date: 12/12/22     Intervention(s)  Therapist will teach MI, behavioral activation and assign homework/accountability.    Objective #B  Patient will identify at least 5 triggers for anxiety and practice skills to address anxiety  Patient will use mindfulness, grounding and proactive communication skills, emotion recognition/regulation skills  Status: New - Date: 12/12/22     Intervention(s)  Therapist will teach CBT skills and mindfulness-grounding skills.    Objective #C  Patient will practice exposure to driving on freeways to reduce anxiety/panic.  Status: New - Date: 12/12/22     Intervention(s)  Therapist will assign homework and track progress.      Patient has reviewed and agreed to the above plan.      Tonya House, LICSW  December 12, 2022                                                           ______________________________________________________________________

## 2023-01-19 ENCOUNTER — VIRTUAL VISIT (OUTPATIENT)
Dept: PSYCHOLOGY | Facility: CLINIC | Age: 33
End: 2023-01-19
Payer: COMMERCIAL

## 2023-01-19 DIAGNOSIS — F41.1 GAD (GENERALIZED ANXIETY DISORDER): Primary | ICD-10-CM

## 2023-01-19 PROCEDURE — 90834 PSYTX W PT 45 MINUTES: CPT | Mod: GT

## 2023-01-19 NOTE — PROGRESS NOTES
[facility]                                     Progress Note    Patient Name: Conor Matthew  Date: 1/19/23         Service Type: Individual      Session Start Time: 1:10 pm  Session End Time: 1:50 pm     Session Length: 40 min    Session #: 6    Attendees: Client attended alone    Service Modality:  Video Visit:      Provider verified identity through the following two step process.  Patient provided:  Patient is known previously to provider    Telemedicine Visit: The patient's condition can be safely assessed and treated via synchronous audio and visual telemedicine encounter.      Reason for Telemedicine Visit: Patient convenience (e.g. access to timely appointments / distance to available provider)    Originating Site (Patient Location): Patient's home    Distant Site (Provider Location): Provider Remote Setting- Home Office    Consent:  The patient/guardian has verbally consented to: the potential risks and benefits of telemedicine (video visit) versus in person care; bill my insurance or make self-payment for services provided; and responsibility for payment of non-covered services.     Patient would like the video invitation sent by:  My Chart    Mode of Communication:  Video Conference via Amwell    Distant Location (Provider):  Off-site    As the provider I attest to compliance with applicable laws and regulations related to telemedicine.    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 Relational stressor with wife conflict, lack of connection/emotinoal support. Focusing on changing sleep schedule to accommodate am workouts.  Brother substance abuse problems, parentification, need for boundaries.      Treatment Objective(s) Addressed in This Session:   Exercise consistently and manage sleep hours  Find replacement for alcohol, maintain sobriety, pursue nicotine sachet elimination  Use boundaries and communication  in relationships     Intervention:  Motivational Interviewing  Target Behavior: alcohol use and exercise, nutrition, nicotine sachet use (reduce/eliminate), mindful communication with wife    Stage of Change: ACTION (Actively working towards change)    MI Intervention: Expressed Empathy/Understanding, Supported Autonomy, Collaboration, Evocation, Open-ended questions, Reflections: simple and complex and Change talk (evoked)     Change Talk Expressed by the Patient: Desire to change Ability to change Reasons to change Need to change Activation Taking steps    Provider Response to Change Talk: E - Evoked more info from patient about behavior change, A - Affirmed patient's thoughts, decisions, or attempts at behavior change, R - Reflected patient's change talk and S - Summarized patient's change talk statements    Solution focused:  Provided active listening and validation. Processed recent  proactive communication, surfaced core belief impacted by father's expectations, identified opportunity to see more meaning in work. Recognized remote work's impact on connection/support.    Assessments completed prior to visit: JESUS  The following assessments were completed by patient for this visit:  PHQ2:   PHQ-2 ( 1999 [employer]) 1/19/2023 10/10/2022 5/3/2022 5/3/2022 3/17/2022 3/17/2022 11/1/2021   Q1: Little interest or pleasure in doing things 1 1 2 - 1 1 3   Q2: Feeling down, depressed or hopeless 1 1 2 - 0 1 0   PHQ-2 Score 2 2 4 - 1 2 3   PHQ-2 Total Score (12-17 Years)- Positive if 3 or  more points; Administer PHQ-A if positive - - - - - - 3   Q1: Little interest or pleasure in doing things Several days Several days More than half the days - Several days - Nearly every day   Q2: Feeling down, depressed or hopeless Several days Several days More than half the days - Not at all - Not at all   PHQ-2 Score 2 2 Incomplete 4 Incomplete 1 Incomplete     PHQ9:   PHQ-9 SCORE 11/1/2021 11/1/2021 3/17/2022 5/3/2022   PHQ-9 Total Score MyChart - 1 (Minimal depression) - 4 (Minimal depression)   PHQ-9 Total Score 1 1 2 4     GAD2:   ERWIN-2 10/10/2022 1/19/2023   Feeling nervous, anxious, or on edge 2 1   Not being able to stop or control worrying 3 1   ERWIN-2 Total Score 5 2     GAD7:   ERWIN-7 SCORE 11/1/2021 11/1/2021 3/17/2022 3/17/2022 5/3/2022 10/10/2022   Total Score - 10 (moderate anxiety) - 3 (minimal anxiety) 12 (moderate anxiety) 10 (moderate anxiety)   Total Score 10 10 3 3 12 10     CAGE-AID:   CAGE-AID Total Score 10/10/2022   Total Score 1   Total Score MyChart 1 (A total score of 2 or greater is considered clinically significant)     PROMIS 10-Global Health (only subscores and total score):   PROMIS-10 Scores Only 10/10/2022 1/19/2023   Global Mental Health Score 8 12   Global Physical Health Score 9 12   PROMIS TOTAL - SUBSCORES 17 24     [place] Suicide Severity Rating Scale (Lifetime/Recent)  [place] Suicide Severity Rating (Lifetime/Recent) 10/10/2022   Q1 Wished to be Dead (Past Month) yes   Q2 Suicidal Thoughts (Past Month) no   Q3 Suicidal Thought Method no   Q4 Suicidal Intent without Specific Plan no   Q5 Suicide Intent with Specific Plan no   Q6 Suicide Behavior (Lifetime) no   Level of Risk per Screen low risk         ASSESSMENT: Current Emotional / Mental Status (status of significant symptoms):   Risk status (Self / Other harm or suicidal ideation)   Patient denies current fears or concerns for personal safety.   Patient denies current or recent suicidal ideation or  behaviors.   Patient denies current or recent homicidal ideation or behaviors.   Patient denies current or recent self injurious behavior or ideation.   Patient denies other safety concerns.   Patient reports there has been no change in risk factors since their last session.     Patient reports there has been no change in protective factors since their last session.     Recommended that patient call 911 or go to the local ED should there be a change in any of these risk factors.     Appearance:   Appropriate    Eye Contact:   Good    Psychomotor Behavior: Normal    Attitude:   Cooperative  Pleasant   Orientation:   All   Speech    Rate / Production: Normal     Volume:  Normal    Mood:    Anxious    Affect:    Appropriate    Thought Content:  Clear    Thought Form:  Coherent  Goal Directed  Logical    Insight:    Intellectual Insight     Medication Review:   No changes to current psychiatric medication(s)     Medication Compliance:   Yes     Changes in Health Issues:   None reported     Chemical Use Review:   Substance Use: decrease in alcohol .  Patient reports frequency of use has stopped drinking.  Provided encouragement towards sobriety     Reducing nicotine patch use, currently at 20% of previous use.     Tobacco Use: No change in amount of tobacco use since last session.  Patient declined discussion at this time    Diagnosis:  1. ERWIN (generalized anxiety disorder)      Collateral Reports Completed:   Not Applicable    PLAN: (Patient Tasks / Therapist Tasks / Other)  Continue exercise, sleep, abstinence. Use mindful communication to get emotional needs met. Work to Eliminate nicotine pouches.     Tonya House, [facility]  1/19/2023                                                Individual Treatment Plan    Patient's Name: Conor Matthew  YOB: 1990    Date of Creation: 12/12/22  Date Treatment Plan Last Reviewed/Revised:     DSM5 Diagnoses: 300.02 (F41.1) Generalized Anxiety Disorder  Psychosocial /  Contextual Factors: hx of trauma, substance abuse  PROMIS (reviewed every 90 days): 21  (10/10/22)    Referral / Collaboration:  Referral to another professional/service is not indicated at this time..    Anticipated number of session for this episode of care: 9-12 sessions  Anticipation frequency of session: Every other week  Anticipated Duration of each session: 38-52 minutes  Treatment plan will be reviewed in 90 days or when goals have been changed.       MeasurableTreatment Goal(s) related to diagnosis / functional impairment(s)  Goal 1: Patient will experience a reduction in anxiety and panic and improvement in functioning.    I will know I've met my goal when I'm feeling better overall (paraphrase).      Objective #A (Patient Action)    Patient will address substance abuse, return to exercise, sleep consistency, and  health practices to support improved functioning.  Status: New - Date: 12/12/22     Intervention(s)  Therapist will teach MI, behavioral activation and assign homework/accountability.    Objective #B  Patient will identify at least 5 triggers for anxiety and practice skills to address anxiety  Patient will use mindfulness, grounding and proactive communication skills, emotion recognition/regulation skills  Status: New - Date: 12/12/22     Intervention(s)  Therapist will teach CBT skills and mindfulness-grounding skills.    Objective #C  Patient will practice exposure to driving on freeways to reduce anxiety/panic.  Status: New - Date: 12/12/22     Intervention(s)  Therapist will assign homework and track progress.      Patient has reviewed and agreed to the above plan.      Tonya House, [facility]  December 12, 2022                                                           ______________________________________________________________________

## 2023-02-02 ENCOUNTER — VIRTUAL VISIT (OUTPATIENT)
Dept: PSYCHOLOGY | Facility: CLINIC | Age: 33
End: 2023-02-02
Payer: COMMERCIAL

## 2023-02-02 DIAGNOSIS — F41.1 GAD (GENERALIZED ANXIETY DISORDER): Primary | ICD-10-CM

## 2023-02-02 PROCEDURE — 90834 PSYTX W PT 45 MINUTES: CPT | Mod: GT

## 2023-02-02 NOTE — PROGRESS NOTES
[facility]                                     Progress Note    Patient Name: Conor Matthew  Date: 2/2/23         Service Type: Individual      Session Start Time: 10:10 am  Session End Time: 10:55 am     Session Length: 45 min    Session #: 7    Attendees: Client attended alone    Service Modality:  Video Visit:      Provider verified identity through the following two step process.  Patient provided:  Patient is known previously to provider    Telemedicine Visit: The patient's condition can be safely assessed and treated via synchronous audio and visual telemedicine encounter.      Reason for Telemedicine Visit: Patient convenience (e.g. access to timely appointments / distance to available provider)    Originating Site (Patient Location): Patient's home    Distant Site (Provider Location): Provider Remote Setting- Home Office    Consent:  The patient/guardian has verbally consented to: the potential risks and benefits of telemedicine (video visit) versus in person care; bill my insurance or make self-payment for services provided; and responsibility for payment of non-covered services.     Patient would like the video invitation sent by:  My Chart    Mode of Communication:  Video Conference via Amwell    Distant Location (Provider):  Off-site    As the provider I attest to compliance with applicable laws and regulations related to telemedicine.    DATA  Interactive Complexity: No  Crisis: No      Progress Since Last Session (Related to Symptoms / Goals / Homework):   Symptoms: Improving anxiety, low energy, sleep deprived due to coaching schedule    Homework: Progress has made behavioral changes      Episode of Care Goals: Minimal progress - ACTION (Actively working towards change); Intervened by reinforcing change plan / affirming steps taken     Current / Ongoing Stressors and Concerns:  Current: Growing awareness of alcohol's past utility as a social lubricant. Continued abstinence from  drinking, continued exercise-working out regularly with a friend at the gym, coaching basketball, playing volleyball, soccer. Relational stressors with wife, working on connection. Brother substance abuse problems, parentification, need for boundaries.      Treatment Objective(s) Addressed in This Session:   Exercise consistently and manage sleep hours  Find replacement for alcohol, maintain sobriety, pursue nicotine sachet elimination  Use boundaries and communication  in relationships     Intervention:  Motivational Interviewing  Target Behavior: alcohol use and exercise, nutrition, nicotine sachet use (reduce/eliminate), mindful communication with wife    Stage of Change: ACTION (Actively working towards change)    MI Intervention: Expressed Empathy/Understanding, Supported Autonomy, Collaboration, Evocation, Open-ended questions, Reflections: simple and complex and Change talk (evoked)     Change Talk Expressed by the Patient: Desire to change Ability to change Reasons to change Need to change Activation Taking steps    Provider Response to Change Talk: E - Evoked more info from patient about behavior change, A - Affirmed patient's thoughts, decisions, or attempts at behavior change, R - Reflected patient's change talk and S - Summarized patient's change talk statements    Solution focused:  Provided active listening and validation. Reinforced recent success with proactive communication, and ongoing opportunity to find meaning in work.     Assessments completed prior to visit: JESUS  The following assessments were completed by patient for this visit:  PHQ2:   PHQ-2 ( 1999 [employer]) 2/2/2023 1/19/2023 10/10/2022 5/3/2022 5/3/2022 3/17/2022 3/17/2022   Q1: Little interest or pleasure in doing things 1 1 1 2 - 1 1   Q2: Feeling down, depressed or hopeless 1 1 1 2 - 0 1   PHQ-2 Score 2 2 2 4 - 1 2   PHQ-2 Total Score (12-17 Years)- Positive if 3 or more points; Administer PHQ-A if positive - - - - - - -   Q1: Little  interest or pleasure in doing things Several days Several days Several days More than half the days - Several days -   Q2: Feeling down, depressed or hopeless Several days Several days Several days More than half the days - Not at all -   PHQ-2 Score 2 2 2 Incomplete 4 Incomplete 1     PHQ9:   PHQ-9 SCORE 11/1/2021 11/1/2021 3/17/2022 5/3/2022   PHQ-9 Total Score MyChart - 1 (Minimal depression) - 4 (Minimal depression)   PHQ-9 Total Score 1 1 2 4     GAD2:   ERWIN-2 10/10/2022 1/19/2023 2/2/2023   Feeling nervous, anxious, or on edge 2 1 1   Not being able to stop or control worrying 3 1 1   ERWIN-2 Total Score 5 2 2     GAD7:   ERWIN-7 SCORE 11/1/2021 11/1/2021 3/17/2022 3/17/2022 5/3/2022 10/10/2022   Total Score - 10 (moderate anxiety) - 3 (minimal anxiety) 12 (moderate anxiety) 10 (moderate anxiety)   Total Score 10 10 3 3 12 10     CAGE-AID:   CAGE-AID Total Score 10/10/2022   Total Score 1   Total Score MyChart 1 (A total score of 2 or greater is considered clinically significant)     PROMIS 10-Global Health (only subscores and total score):   PROMIS-10 Scores Only 10/10/2022 1/19/2023   Global Mental Health Score 8 12   Global Physical Health Score 9 12   PROMIS TOTAL - SUBSCORES 17 24     [place] Suicide Severity Rating Scale (Lifetime/Recent)  [place] Suicide Severity Rating (Lifetime/Recent) 10/10/2022   Q1 Wished to be Dead (Past Month) yes   Q2 Suicidal Thoughts (Past Month) no   Q3 Suicidal Thought Method no   Q4 Suicidal Intent without Specific Plan no   Q5 Suicide Intent with Specific Plan no   Q6 Suicide Behavior (Lifetime) no   Level of Risk per Screen low risk         ASSESSMENT: Current Emotional / Mental Status (status of significant symptoms):   Risk status (Self / Other harm or suicidal ideation)   Patient denies current fears or concerns for personal safety.   Patient denies current or recent suicidal ideation or behaviors.   Patient denies current or recent homicidal ideation or  behaviors.   Patient denies current or recent self injurious behavior or ideation.   Patient denies other safety concerns.   Patient reports there has been no change in risk factors since their last session.     Patient reports there has been no change in protective factors since their last session.     Recommended that patient call 911 or go to the local ED should there be a change in any of these risk factors.     Appearance:   Appropriate    Eye Contact:   Good    Psychomotor Behavior: Normal    Attitude:   Cooperative  Pleasant   Orientation:   All   Speech    Rate / Production: Normal     Volume:  Normal    Mood:    Anxious    Affect:    Appropriate    Thought Content:  Clear    Thought Form:  Coherent  Goal Directed  Logical    Insight:    Intellectual Insight     Medication Review:   No changes to current psychiatric medication(s)     Medication Compliance:   Yes     Changes in Health Issues:   None reported     Chemical Use Review:   Substance Use: decrease in alcohol .  Patient reports frequency of use has stopped drinking.  Provided encouragement towards sobriety  Provided support and affirmation for steps taken towards sobriety      Reducing nicotine patch use, currently at 20% of previous use.     Tobacco Use: No change in amount of tobacco use since last session.  Provided encouragement to quit   Provided support and affirmation for steps taken towards quiting     Diagnosis:  1. ERWIN (generalized anxiety disorder)      Collateral Reports Completed:   Not Applicable    PLAN: (Patient Tasks / Therapist Tasks / Other)  Continue exercise, sleep, abstinence. Use mindful communication to get emotional needs met. Work to eliminate nicotine pouches. Do PT exercises, continue with sessions.     NEENA Hernandez  2/2/2023                                                Individual Treatment Plan    Patient's Name: Conor Matthew  YOB: 1990    Date of Creation: 12/12/22  Date Treatment Plan Last  Reviewed/Revised:     DSM5 Diagnoses: 300.02 (F41.1) Generalized Anxiety Disorder  Psychosocial / Contextual Factors: hx of trauma, substance abuse  PROMIS (reviewed every 90 days): 21  (10/10/22)    Referral / Collaboration:  Referral to another professional/service is not indicated at this time..    Anticipated number of session for this episode of care: 9-12 sessions  Anticipation frequency of session: Every other week  Anticipated Duration of each session: 38-52 minutes  Treatment plan will be reviewed in 90 days or when goals have been changed.       MeasurableTreatment Goal(s) related to diagnosis / functional impairment(s)  Goal 1: Patient will experience a reduction in anxiety and panic and improvement in functioning.    I will know I've met my goal when I'm feeling better overall (paraphrase).      Objective #A (Patient Action)    Patient will address substance abuse, return to exercise, sleep consistency, and  health practices to support improved functioning.  Status: New - Date: 12/12/22     Intervention(s)  Therapist will teach MI, behavioral activation and assign homework/accountability.    Objective #B  Patient will identify at least 5 triggers for anxiety and practice skills to address anxiety  Patient will use mindfulness, grounding and proactive communication skills, emotion recognition/regulation skills  Status: New - Date: 12/12/22     Intervention(s)  Therapist will teach CBT skills and mindfulness-grounding skills.    Objective #C  Patient will practice exposure to driving on freeways to reduce anxiety/panic.  Status: New - Date: 12/12/22     Intervention(s)  Therapist will assign homework and track progress.      Patient has reviewed and agreed to the above plan.      Tonya House, [facility]  December 12, 2022                                                           ______________________________________________________________________

## 2023-02-11 ENCOUNTER — HEALTH MAINTENANCE LETTER (OUTPATIENT)
Age: 33
End: 2023-02-11

## 2023-02-23 ENCOUNTER — VIRTUAL VISIT (OUTPATIENT)
Dept: PSYCHOLOGY | Facility: CLINIC | Age: 33
End: 2023-02-23
Payer: COMMERCIAL

## 2023-02-23 DIAGNOSIS — F41.1 GAD (GENERALIZED ANXIETY DISORDER): Primary | ICD-10-CM

## 2023-02-23 PROCEDURE — 90834 PSYTX W PT 45 MINUTES: CPT | Mod: VID

## 2023-02-23 NOTE — PROGRESS NOTES
[facility]                                     Progress Note    Patient Name: Conor Matthew  Date: 2/23/23         Service Type: Individual      Session Start Time: 3:00 pm  Session End Time: 3:45 pm     Session Length: 45 min    Session #: 8    Attendees: Client attended alone    Service Modality:  Video Visit:      Provider verified identity through the following two step process.  Patient provided:  Patient is known previously to provider    Telemedicine Visit: The patient's condition can be safely assessed and treated via synchronous audio and visual telemedicine encounter.      Reason for Telemedicine Visit: Patient convenience (e.g. access to timely appointments / distance to available provider)    Originating Site (Patient Location): Patient's home    Distant Site (Provider Location): Provider Remote Setting- Home Office    Consent:  The patient/guardian has verbally consented to: the potential risks and benefits of telemedicine (video visit) versus in person care; bill my insurance or make self-payment for services provided; and responsibility for payment of non-covered services.     Patient would like the video invitation sent by:  My Chart    Mode of Communication:  Video Conference via Amwell    Distant Location (Provider):  Off-site    As the provider I attest to compliance with applicable laws and regulations related to telemedicine.    DATA  Interactive Complexity: No  Crisis: No      Progress Since Last Session (Related to Symptoms / Goals / Homework):   Symptoms: Improving anxiety, low energy, sleep deprived due to coaching schedule    Homework: Progress has made behavioral changes      Episode of Care Goals: Minimal progress - ACTION (Actively working towards change); Intervened by reinforcing change plan / affirming steps taken     Current / Ongoing Stressors and Concerns:  Current: Brother with MH/CE return to use/suicidal ideation. Continued abstinence from drinking,  physical therapy for concussion, continued exercise-working out regularly with a friend at the gym, coaching basketball, playing volleyball, soccer. Relational stressors with wife, working on connection. Brother substance abuse problems, parentification, need for boundaries.      Treatment Objective(s) Addressed in This Session:   Exercise consistently and manage sleep hours  Find replacement for alcohol, maintain sobriety, pursue nicotine sachet elimination  Use boundaries and communication  in relationships     Intervention:  Motivational Interviewing  Target Behavior: alcohol use and exercise, nutrition, nicotine sachet use (reduce/eliminate), mindful communication with wife    Stage of Change: ACTION (Actively working towards change)    MI Intervention: Expressed Empathy/Understanding, Supported Autonomy, Collaboration, Evocation, Open-ended questions, Reflections: simple and complex and Change talk (evoked)     Change Talk Expressed by the Patient: Desire to change Ability to change Reasons to change Need to change Activation Taking steps    Provider Response to Change Talk: E - Evoked more info from patient about behavior change, A - Affirmed patient's thoughts, decisions, or attempts at behavior change, R - Reflected patient's change talk and S - Summarized patient's change talk statements    Solution focused:  Provided active listening and validation. Reinforced recent success with proactive communication, and ongoing opportunity to find meaning in work.     Assessments completed prior to visit: JESUS  The following assessments were completed by patient for this visit:  PHQ2:   PHQ-2 ( 1999 [employer]) 2/2/2023 1/19/2023 10/10/2022 5/3/2022 5/3/2022 3/17/2022 3/17/2022   Q1: Little interest or pleasure in doing things 1 1 1 2 - 1 1   Q2: Feeling down, depressed or hopeless 1 1 1 2 - 0 1   PHQ-2 Score 2 2 2 4 - 1 2   PHQ-2 Total Score (12-17 Years)- Positive if 3 or more points; Administer PHQ-A if positive - - - -  - - -   Q1: Little interest or pleasure in doing things Several days Several days Several days More than half the days - Several days -   Q2: Feeling down, depressed or hopeless Several days Several days Several days More than half the days - Not at all -   PHQ-2 Score 2 2 2 Incomplete 4 Incomplete 1     PHQ9:   PHQ-9 SCORE 11/1/2021 11/1/2021 3/17/2022 5/3/2022   PHQ-9 Total Score MyChart - 1 (Minimal depression) - 4 (Minimal depression)   PHQ-9 Total Score 1 1 2 4     GAD2:   ERWIN-2 10/10/2022 1/19/2023 2/2/2023   Feeling nervous, anxious, or on edge 2 1 1   Not being able to stop or control worrying 3 1 1   ERWIN-2 Total Score 5 2 2     GAD7:   ERWIN-7 SCORE 11/1/2021 11/1/2021 3/17/2022 3/17/2022 5/3/2022 10/10/2022   Total Score - 10 (moderate anxiety) - 3 (minimal anxiety) 12 (moderate anxiety) 10 (moderate anxiety)   Total Score 10 10 3 3 12 10     CAGE-AID:   CAGE-AID Total Score 10/10/2022   Total Score 1   Total Score MyChart 1 (A total score of 2 or greater is considered clinically significant)     PROMIS 10-Global Health (only subscores and total score):   PROMIS-10 Scores Only 10/10/2022 1/19/2023 2/2/2023   Global Mental Health Score 8 12 11   Global Physical Health Score 9 12 12   PROMIS TOTAL - SUBSCORES 17 24 23     [place] Suicide Severity Rating Scale (Lifetime/Recent)  [place] Suicide Severity Rating (Lifetime/Recent) 10/10/2022   Q1 Wished to be Dead (Past Month) yes   Q2 Suicidal Thoughts (Past Month) no   Q3 Suicidal Thought Method no   Q4 Suicidal Intent without Specific Plan no   Q5 Suicide Intent with Specific Plan no   Q6 Suicide Behavior (Lifetime) no   Level of Risk per Screen low risk         ASSESSMENT: Current Emotional / Mental Status (status of significant symptoms):   Risk status (Self / Other harm or suicidal ideation)   Patient denies current fears or concerns for personal safety.   Patient denies current or recent suicidal ideation or behaviors.   Patient denies current or recent  homicidal ideation or behaviors.   Patient denies current or recent self injurious behavior or ideation.   Patient denies other safety concerns.   Patient reports there has been no change in risk factors since their last session.     Patient reports there has been no change in protective factors since their last session.     Recommended that patient call 911 or go to the local ED should there be a change in any of these risk factors.     Appearance:   Appropriate    Eye Contact:   Good    Psychomotor Behavior: Normal    Attitude:   Cooperative  Pleasant   Orientation:   All   Speech    Rate / Production: Normal     Volume:  Normal    Mood:    Anxious    Affect:    Appropriate    Thought Content:  Clear    Thought Form:  Coherent  Goal Directed  Logical    Insight:    Intellectual Insight     Medication Review:   No changes to current psychiatric medication(s)     Medication Compliance:   Yes     Changes in Health Issues:   None reported     Chemical Use Review:   Substance Use: decrease in alcohol .  Patient reports frequency of use has stopped drinking.  Provided encouragement towards sobriety  Provided support and affirmation for steps taken towards sobriety      Reducing nicotine patch use, currently at 20% of previous use.     Tobacco Use: No change in amount of tobacco use since last session.  Provided encouragement to quit   Provided support and affirmation for steps taken towards quiting     Diagnosis:  1. ERWIN (generalized anxiety disorder)      Collateral Reports Completed:   Not Applicable    PLAN: (Patient Tasks / Therapist Tasks / Other)  Continue exercise, sleep, alcohol abstinence. Use mindful communication to get emotional needs met. Continue work to eliminate nicotine pouches. Do PT exercises, continue with sessions.     Tonya House, [facility]  2/23/2023                                                Individual Treatment Plan    Patient's Name: Conor Matthew  YOB: 1990    Date of  Creation: 12/12/22  Date Treatment Plan Last Reviewed/Revised:     DSM5 Diagnoses: 300.02 (F41.1) Generalized Anxiety Disorder  Psychosocial / Contextual Factors: hx of trauma, substance abuse  PROMIS (reviewed every 90 days): 21  (10/10/22)    Referral / Collaboration:  Referral to another professional/service is not indicated at this time..    Anticipated number of session for this episode of care: 9-12 sessions  Anticipation frequency of session: Every other week  Anticipated Duration of each session: 38-52 minutes  Treatment plan will be reviewed in 90 days or when goals have been changed.       MeasurableTreatment Goal(s) related to diagnosis / functional impairment(s)  Goal 1: Patient will experience a reduction in anxiety and panic and improvement in functioning.    I will know I've met my goal when I'm feeling better overall (paraphrase).      Objective #A (Patient Action)    Patient will address substance abuse, return to exercise, sleep consistency, and  health practices to support improved functioning.  Status: New - Date: 12/12/22     Intervention(s)  Therapist will teach MI, behavioral activation and assign homework/accountability.    Objective #B  Patient will identify at least 5 triggers for anxiety and practice skills to address anxiety  Patient will use mindfulness, grounding and proactive communication skills, emotion recognition/regulation skills  Status: New - Date: 12/12/22     Intervention(s)  Therapist will teach CBT skills and mindfulness-grounding skills.    Objective #C  Patient will practice exposure to driving on freeways to reduce anxiety/panic.  Status: New - Date: 12/12/22     Intervention(s)  Therapist will assign homework and track progress.      Patient has reviewed and agreed to the above plan.      Tonya House, [facility]  December 12, 2022                                                           ______________________________________________________________________

## 2023-03-27 ENCOUNTER — VIRTUAL VISIT (OUTPATIENT)
Dept: PSYCHOLOGY | Facility: CLINIC | Age: 33
End: 2023-03-27
Payer: COMMERCIAL

## 2023-03-27 DIAGNOSIS — F41.1 GAD (GENERALIZED ANXIETY DISORDER): Primary | ICD-10-CM

## 2023-03-27 PROCEDURE — 90834 PSYTX W PT 45 MINUTES: CPT | Mod: VID

## 2023-03-27 NOTE — PROGRESS NOTES
[facility]                                     Progress Note    Patient Name: Conor Matthew  Date: 3/27/23         Service Type: Individual      Session Start Time: 3:00 pm  Session End Time: 3:45 pm     Session Length: 45 min    Session #: 9    Attendees: Client attended alone    Service Modality:  Video Visit:      Provider verified identity through the following two step process.  Patient provided:  Patient is known previously to provider    Telemedicine Visit: The patient's condition can be safely assessed and treated via synchronous audio and visual telemedicine encounter.      Reason for Telemedicine Visit: Patient convenience (e.g. access to timely appointments / distance to available provider)    Originating Site (Patient Location): Patient's home    Distant Site (Provider Location): Provider Remote Setting- Home Office    Consent:  The patient/guardian has verbally consented to: the potential risks and benefits of telemedicine (video visit) versus in person care; bill my insurance or make self-payment for services provided; and responsibility for payment of non-covered services.     Patient would like the video invitation sent by:  My Chart    Mode of Communication:  Video Conference via Amwell    Distant Location (Provider):  Off-site    As the provider I attest to compliance with applicable laws and regulations related to telemedicine.    DATA  Interactive Complexity: No  Crisis: No      Progress Since Last Session (Related to Symptoms / Goals / Homework):   Symptoms: Improving anxiety, energy    Homework: Progress has made behavioral changes      Episode of Care Goals: Minimal progress - ACTION (Actively working towards change); Intervened by reinforcing change plan / affirming steps taken     Current / Ongoing Stressors and Concerns:  Current: Continued abstinence from drinking, physical therapy for concussion, continued exercise-working out regularly, playing basketball,  volleyball, soccer. Relational stressors with wife, working on connection. Brother substance abuse problems, parentification, need for boundaries.      Treatment Objective(s) Addressed in This Session:   Exercise consistently and manage sleep hours  Find replacement for alcohol, maintain sobriety, pursue nicotine sachet elimination  Use boundaries and communication  in relationships     Intervention:  Motivational Interviewing  Target Behavior: alcohol use and exercise, nutrition, nicotine sachet use (reduce/eliminate), mindful communication with wife, sleep hygiene    Stage of Change: RELAPSE (Returned to unhealthy behavior)    MI Intervention: Expressed Empathy/Understanding, Supported Autonomy, Collaboration, Evocation, Open-ended questions, Reflections: simple and complex and Change talk (evoked)     Change Talk Expressed by the Patient: Desire to change Ability to change Reasons to change Need to change Activation Taking steps    Provider Response to Change Talk: E - Evoked more info from patient about behavior change, A - Affirmed patient's thoughts, decisions, or attempts at behavior change, R - Reflected patient's change talk and S - Summarized patient's change talk statements    Solution focused:  Provided active listening and validation. Reinforced recent success with proactive communication, and ongoing opportunity to find meaning in work.     Assessments completed prior to visit: JESUS  The following assessments were completed by patient for this visit:  PHQ2:   PHQ-2 ( 1999 [employer]) 2/2/2023 1/19/2023 10/10/2022 5/3/2022 5/3/2022 3/17/2022 3/17/2022   Q1: Little interest or pleasure in doing things 1 1 1 2 - 1 1   Q2: Feeling down, depressed or hopeless 1 1 1 2 - 0 1   PHQ-2 Score 2 2 2 4 - 1 2   PHQ-2 Total Score (12-17 Years)- Positive if 3 or more points; Administer PHQ-A if positive - - - - - - -   Q1: Little interest or pleasure in doing things Several days Several days Several days More than half the  days - Several days -   Q2: Feeling down, depressed or hopeless Several days Several days Several days More than half the days - Not at all -   PHQ-2 Score 2 2 2 Incomplete 4 Incomplete 1     PHQ9:   PHQ-9 SCORE 11/1/2021 11/1/2021 3/17/2022 5/3/2022   PHQ-9 Total Score MyChart - 1 (Minimal depression) - 4 (Minimal depression)   PHQ-9 Total Score 1 1 2 4     GAD2:   ERWIN-2 10/10/2022 1/19/2023 2/2/2023   Feeling nervous, anxious, or on edge 2 1 1   Not being able to stop or control worrying 3 1 1   ERWIN-2 Total Score 5 2 2     GAD7:   ERWIN-7 SCORE 11/1/2021 11/1/2021 3/17/2022 3/17/2022 5/3/2022 10/10/2022   Total Score - 10 (moderate anxiety) - 3 (minimal anxiety) 12 (moderate anxiety) 10 (moderate anxiety)   Total Score 10 10 3 3 12 10     CAGE-AID:   CAGE-AID Total Score 10/10/2022   Total Score 1   Total Score MyChart 1 (A total score of 2 or greater is considered clinically significant)     PROMIS 10-Global Health (only subscores and total score):   PROMIS-10 Scores Only 10/10/2022 1/19/2023 2/2/2023   Global Mental Health Score 8 12 11   Global Physical Health Score 9 12 12   PROMIS TOTAL - SUBSCORES 17 24 23     [place] Suicide Severity Rating Scale (Lifetime/Recent)  [place] Suicide Severity Rating (Lifetime/Recent) 10/10/2022   Q1 Wished to be Dead (Past Month) yes   Q2 Suicidal Thoughts (Past Month) no   Q3 Suicidal Thought Method no   Q4 Suicidal Intent without Specific Plan no   Q5 Suicide Intent with Specific Plan no   Q6 Suicide Behavior (Lifetime) no   Level of Risk per Screen low risk         ASSESSMENT: Current Emotional / Mental Status (status of significant symptoms):   Risk status (Self / Other harm or suicidal ideation)   Patient denies current fears or concerns for personal safety.   Patient denies current or recent suicidal ideation or behaviors.   Patient denies current or recent homicidal ideation or behaviors.   Patient denies current or recent self injurious behavior or ideation.   Patient  denies other safety concerns.   Patient reports there has been no change in risk factors since their last session.     Patient reports there has been no change in protective factors since their last session.     Recommended that patient call 911 or go to the local ED should there be a change in any of these risk factors.     Appearance:   Appropriate    Eye Contact:   Good    Psychomotor Behavior: Normal    Attitude:   Cooperative  Pleasant   Orientation:   All   Speech    Rate / Production: Normal     Volume:  Normal    Mood:    Anxious    Affect:    Appropriate    Thought Content:  Clear    Thought Form:  Coherent  Goal Directed  Logical    Insight:    Intellectual Insight     Medication Review:   No changes to current psychiatric medication(s)     Medication Compliance:   Yes     Changes in Health Issues:   None reported     Chemical Use Review:   Substance Use: decrease in alcohol .  Patient reports frequency of use has stopped drinking.  Provided encouragement towards sobriety  Provided support and affirmation for steps taken towards sobriety      Reducing nicotine patch use, currently at 20% of previous use.     Tobacco Use: No change in amount of tobacco use since last session.  Provided encouragement to quit   Provided support and affirmation for steps taken towards quiting     Diagnosis:  1. ERWIN (generalized anxiety disorder)      Collateral Reports Completed:   Not Applicable    PLAN: (Patient Tasks / Therapist Tasks / Other)  Continue exercise, sleep, alcohol abstinence. Use mindful communication to get emotional needs met. Continue work to eliminate nicotine pouches. Do PT exercises, continue with sessions.     Tonya House, [facility]  2/23/2023                                                Individual Treatment Plan    Patient's Name: Conor Matthew  YOB: 1990    Date of Creation: 12/12/22  Date Treatment Plan Last Reviewed/Revised:     DSM5 Diagnoses: 300.02 (F41.1) Generalized Anxiety  Disorder  Psychosocial / Contextual Factors: hx of trauma, substance abuse  PROMIS (reviewed every 90 days): 21  (10/10/22)    Referral / Collaboration:  Referral to another professional/service is not indicated at this time..    Anticipated number of session for this episode of care: 9-12 sessions  Anticipation frequency of session: Every other week  Anticipated Duration of each session: 38-52 minutes  Treatment plan will be reviewed in 90 days or when goals have been changed.       MeasurableTreatment Goal(s) related to diagnosis / functional impairment(s)  Goal 1: Patient will experience a reduction in anxiety and panic and improvement in functioning.    I will know I've met my goal when I'm feeling better overall (paraphrase).      Objective #A (Patient Action)    Patient will address substance abuse, return to exercise, sleep consistency, and  health practices to support improved functioning.  Status: New - Date: 12/12/22     Intervention(s)  Therapist will teach MI, behavioral activation and assign homework/accountability.    Objective #B  Patient will identify at least 5 triggers for anxiety and practice skills to address anxiety  Patient will use mindfulness, grounding and proactive communication skills, emotion recognition/regulation skills  Status: New - Date: 12/12/22     Intervention(s)  Therapist will teach CBT skills and mindfulness-grounding skills.    Objective #C  Patient will practice exposure to driving on freeways to reduce anxiety/panic.  Status: New - Date: 12/12/22     Intervention(s)  Therapist will assign homework and track progress.      Patient has reviewed and agreed to the above plan.      Tonya House, [facility]  December 12, 2022                                                           ______________________________________________________________________

## 2023-04-26 ENCOUNTER — TRANSFERRED RECORDS (OUTPATIENT)
Dept: HEALTH INFORMATION MANAGEMENT | Facility: CLINIC | Age: 33
End: 2023-04-26
Payer: COMMERCIAL

## 2023-04-27 ENCOUNTER — MEDICAL CORRESPONDENCE (OUTPATIENT)
Dept: HEALTH INFORMATION MANAGEMENT | Facility: CLINIC | Age: 33
End: 2023-04-27
Payer: COMMERCIAL

## 2023-05-01 ENCOUNTER — TRANSCRIBE ORDERS (OUTPATIENT)
Dept: OTHER | Age: 33
End: 2023-05-01

## 2023-05-01 DIAGNOSIS — R07.9 CHEST PAIN, EXERTIONAL: Primary | ICD-10-CM

## 2023-05-01 DIAGNOSIS — F07.81 POST-CONCUSSIONAL SYNDROME: ICD-10-CM

## 2023-05-02 ENCOUNTER — MEDICAL CORRESPONDENCE (OUTPATIENT)
Dept: HEALTH INFORMATION MANAGEMENT | Facility: CLINIC | Age: 33
End: 2023-05-02
Payer: COMMERCIAL

## 2023-05-03 ENCOUNTER — TELEPHONE (OUTPATIENT)
Dept: OPHTHALMOLOGY | Facility: CLINIC | Age: 33
End: 2023-05-03
Payer: COMMERCIAL

## 2023-05-03 ENCOUNTER — TRANSCRIBE ORDERS (OUTPATIENT)
Dept: OTHER | Age: 33
End: 2023-05-03

## 2023-05-03 DIAGNOSIS — H53.459: Primary | ICD-10-CM

## 2023-05-03 DIAGNOSIS — H54.7 VISION IMPAIRMENT: ICD-10-CM

## 2023-05-03 NOTE — TELEPHONE ENCOUNTER
Called and LVM     Neuro reviewed request and would like Conor to see a general heri Bangura Communication Facilitator on 5/3/2023 at 3:56 PM

## 2023-05-25 ENCOUNTER — TRANSFERRED RECORDS (OUTPATIENT)
Dept: FAMILY MEDICINE | Facility: CLINIC | Age: 33
End: 2023-05-25
Payer: COMMERCIAL

## 2023-06-05 ENCOUNTER — OFFICE VISIT (OUTPATIENT)
Dept: OPHTHALMOLOGY | Facility: CLINIC | Age: 33
End: 2023-06-05
Attending: OPHTHALMOLOGY
Payer: COMMERCIAL

## 2023-06-05 DIAGNOSIS — H53.47 HETERONYMOUS BILATERAL FIELD DEFECTS IN VISUAL FIELD: ICD-10-CM

## 2023-06-05 DIAGNOSIS — H31.002 CHOROID SCAR OF LEFT EYE: Primary | ICD-10-CM

## 2023-06-05 PROBLEM — H53.459 PERIPHERAL VISUAL FIELD DEFECT: Status: ACTIVE | Noted: 2023-06-05

## 2023-06-05 PROCEDURE — 92015 DETERMINE REFRACTIVE STATE: CPT

## 2023-06-05 PROCEDURE — G0463 HOSPITAL OUTPT CLINIC VISIT: HCPCS | Performed by: OPHTHALMOLOGY

## 2023-06-05 PROCEDURE — 99203 OFFICE O/P NEW LOW 30 MIN: CPT | Performed by: OPHTHALMOLOGY

## 2023-06-05 PROCEDURE — 92134 CPTRZ OPH DX IMG PST SGM RTA: CPT | Performed by: OPHTHALMOLOGY

## 2023-06-05 PROCEDURE — 92083 EXTENDED VISUAL FIELD XM: CPT | Performed by: OPHTHALMOLOGY

## 2023-06-05 PROCEDURE — 92133 CPTRZD OPH DX IMG PST SGM ON: CPT | Performed by: OPHTHALMOLOGY

## 2023-06-05 PROCEDURE — 99207 OCT OPTIC NERVE RNFL SPECTRALIS OU (BOTH EYES): CPT | Mod: 26 | Performed by: OPHTHALMOLOGY

## 2023-06-05 ASSESSMENT — CONF VISUAL FIELD
OS_INFERIOR_TEMPORAL_RESTRICTION: 0
OD_NORMAL: 1
OD_INFERIOR_TEMPORAL_RESTRICTION: 0
OD_SUPERIOR_TEMPORAL_RESTRICTION: 0
OS_NORMAL: 1
OD_SUPERIOR_NASAL_RESTRICTION: 0
OS_SUPERIOR_TEMPORAL_RESTRICTION: 0
METHOD: COUNTING FINGERS
OS_INFERIOR_NASAL_RESTRICTION: 0
OD_INFERIOR_NASAL_RESTRICTION: 0
OS_SUPERIOR_NASAL_RESTRICTION: 0

## 2023-06-05 ASSESSMENT — CUP TO DISC RATIO
OS_RATIO: 0.3
OD_RATIO: 0.3

## 2023-06-05 ASSESSMENT — TONOMETRY
OD_IOP_MMHG: 14
OS_IOP_MMHG: 17
IOP_METHOD: ICARE

## 2023-06-05 ASSESSMENT — REFRACTION_MANIFEST
OD_CYLINDER: +0.50
OS_AXIS: 160
OS_SPHERE: -0.75
OS_CYLINDER: +0.25
OD_SPHERE: -0.50
OD_AXIS: 010

## 2023-06-05 ASSESSMENT — VISUAL ACUITY
OS_SC+: -1
OS_SC: 20/20
OD_SC: 20/20
METHOD: SNELLEN - LINEAR

## 2023-06-05 ASSESSMENT — SLIT LAMP EXAM - LIDS
COMMENTS: NORMAL
COMMENTS: NORMAL

## 2023-06-05 ASSESSMENT — EXTERNAL EXAM - RIGHT EYE: OD_EXAM: NORMAL

## 2023-06-05 ASSESSMENT — EXTERNAL EXAM - LEFT EYE: OS_EXAM: NORMAL

## 2023-06-05 NOTE — NURSING NOTE
"Chief Complaints and History of Present Illnesses   Patient presents with     Follow Up     1 year follow up Post concussion syndrome.     Chief Complaint(s) and History of Present Illness(es)     Follow Up            Comments: 1 year follow up Post concussion syndrome.          Comments    Pt send here for baseline eval for Neuro Ophthalmology. Pt saw Dr Phelan about 1 year ago but would like to see a neuro ophthalmologist.  Pt states vision has been \"ok\". Pt feels vision is normal when pt is at home. Vision not as clear when looking at street signs.  Pt feels peripheral vision on his Left side is blurred, harder to see left periphery. Pt notes that he tries not to drive on the freeway, because it stresses him out. No eye pain today. Occasional floaters in both eyes that come and go. No flashes.    LIZBET Baron June 5, 2023 7:40 AM                       "

## 2023-06-05 NOTE — PROGRESS NOTES
"Chief complaint   Tunnel vision and difficulty focusing    HPI    Conor Matthew 32 year old male       Chief Complaint(s) and History of Present Illness(es)     Follow Up     Additional comments: 1 year follow up Post concussion syndrome.           Comments    Pt send here for baseline eval for Neuro Ophthalmology. Pt saw Dr Phelan about 1 year ago but would like to see a neuro ophthalmologist.  Pt states vision has been \"ok\". Pt feels vision is normal when pt is at home. Vision not as clear when looking at street signs.  Pt feels peripheral vision on his Left side is blurred, harder to see left periphery. Pt notes that he tries not to drive on the freeway, because it stresses him out. No eye pain today. Occasional floaters in both eyes that come and go. No flashes.    LIZBET Baron June 5, 2023 7:40 AM                       Past ocular history   Prior eye surgery/laser/Trauma: -  CTL wearer:No  Glasses : -  Family Hx of eye disease: -    PMH     Past Medical History:   Diagnosis Date     IBS (irritable bowel syndrome)      Seasonal allergic rhinitis due to pollen        PSH     Past Surgical History:   Procedure Laterality Date     KNEE SURGERY       SHOULDER SURGERY      left and right      WISDOM TOOTH EXTRACTION         Meds     Current Outpatient Medications   Medication     hydrOXYzine (ATARAX) 25 MG tablet     propranolol (INDERAL) 10 MG tablet     No current facility-administered medications for this visit.       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  I will refer to retinal for evaluation     Follow up:  Oph: Retina next available.      Attending Physician Attestation:  Complete documentation of historical and exam elements from today's encounter can be found in the full encounter summary report (not reduplicated in this progress note).  I personally obtained the chief complaint(s) and history of present illness.  I confirmed and edited as necessary the review of systems, past medical/surgical history, family history, social history, and examination findings as documented by others; and I examined the patient myself.  I personally reviewed the relevant tests, images, and reports as documented above.  I formulated and edited as necessary the assessment and plan and discussed the findings and management plan with the patient and family. - Zheng Edmondson MD

## 2023-06-14 ENCOUNTER — OFFICE VISIT (OUTPATIENT)
Dept: CARDIOLOGY | Facility: CLINIC | Age: 33
End: 2023-06-14
Attending: FAMILY MEDICINE
Payer: COMMERCIAL

## 2023-06-14 ENCOUNTER — TRANSFERRED RECORDS (OUTPATIENT)
Dept: HEALTH INFORMATION MANAGEMENT | Facility: CLINIC | Age: 33
End: 2023-06-14

## 2023-06-14 ENCOUNTER — ANCILLARY PROCEDURE (OUTPATIENT)
Dept: CARDIOLOGY | Facility: CLINIC | Age: 33
End: 2023-06-14
Attending: INTERNAL MEDICINE
Payer: COMMERCIAL

## 2023-06-14 VITALS
WEIGHT: 235 LBS | HEART RATE: 65 BPM | SYSTOLIC BLOOD PRESSURE: 111 MMHG | BODY MASS INDEX: 29.37 KG/M2 | OXYGEN SATURATION: 97 % | DIASTOLIC BLOOD PRESSURE: 76 MMHG

## 2023-06-14 DIAGNOSIS — R07.9 CHEST PAIN, EXERTIONAL: ICD-10-CM

## 2023-06-14 DIAGNOSIS — F07.81 POST-CONCUSSIONAL SYNDROME: ICD-10-CM

## 2023-06-14 DIAGNOSIS — R00.2 PALPITATIONS: ICD-10-CM

## 2023-06-14 DIAGNOSIS — R00.2 PALPITATIONS: Primary | ICD-10-CM

## 2023-06-14 PROCEDURE — 99205 OFFICE O/P NEW HI 60 MIN: CPT | Performed by: INTERNAL MEDICINE

## 2023-06-14 PROCEDURE — 93244 EXT ECG>48HR<7D REV&INTERPJ: CPT | Performed by: INTERNAL MEDICINE

## 2023-06-14 PROCEDURE — 93242 EXT ECG>48HR<7D RECORDING: CPT | Performed by: INTERNAL MEDICINE

## 2023-06-14 PROCEDURE — 93000 ELECTROCARDIOGRAM COMPLETE: CPT | Performed by: INTERNAL MEDICINE

## 2023-06-14 RX ORDER — ZOLPIDEM TARTRATE 5 MG/1
TABLET ORAL
COMMUNITY
Start: 2023-05-04 | End: 2023-07-18

## 2023-06-14 RX ORDER — BUPROPION HYDROCHLORIDE 150 MG/1
TABLET ORAL
COMMUNITY
Start: 2023-06-09 | End: 2024-08-13 | Stop reason: DRUGHIGH

## 2023-06-14 NOTE — NURSING NOTE
"Chief Complaint   Patient presents with     Chest Pain     New general cardiology for Chest pain, exertional; Post-concussional syndrome.        Initial /76 (BP Location: Left arm, Patient Position: Chair, Cuff Size: Adult Large)   Pulse 65   Wt 106.6 kg (235 lb)   SpO2 97%   BMI 29.37 kg/m   Estimated body mass index is 29.37 kg/m  as calculated from the following:    Height as of 5/3/22: 1.905 m (6' 3\").    Weight as of this encounter: 106.6 kg (235 lb)..  BP completed using cuff size: ASHLI King  "

## 2023-06-14 NOTE — PATIENT INSTRUCTIONS
Thank you for coming to the [facility] @ [facility]; please note the following instructions:    1.  We have applied a holter (heart) monitor for you to wear for 7 days.  You may remove the heart monitor on 6/21/23 at 9:00am.  Please see the enclosed instructions for further information, as well as instructions for removal of the heart monitor and return mailing directions.  If you should have questions regarding your monitor, please call [employer] at [phone].  The Cardiology Nurse will contact you with your results (please see result notification details at bottom of summary).    *PLEASE DO NOT SHOWER OR INDUCE EXCESSIVE SWEATING IN THE FIRST 24 HOURS OF WEARING*    2. Exercise stress Echocardiogram              If you have any questions regarding your visit please contact your care team:     Cardiology  Telephone Number   Ana DOS SANTOS., RN  Laurita MCDERMOTT, RN   Hailee PARKS, RMKATLIN CUELLAR, RMKATLIN GRIFFITH, Visit Facilitator  Leticia STALEY, [facility] [phone] (option 1)   For scheduling appts:     [phone] (select option 1)       For the Device Clinic (Pacemakers and ICD's)  RN's :  Daphne Nation   During business hours: [phone]    *After business hours:  [phone] (select option 4)      Normal test result notifications will be released via [employer] or mailed within 7 business days.  All other test results, will be communicated via telephone once reviewed by your cardiologist.    If you need a medication refill please contact your pharmacy.  Please allow 3 business days for your refill to be completed.    As always, thank you for trusting us with your health care needs!

## 2023-06-14 NOTE — PROGRESS NOTES
[facility]      Referring provider:Mitra Ventura MD    HPI: Mr. Conor Matthew is a 32 year old  male with PMH significant for    -Postconcussion syndrome  -Obesity    Patient is no prior history of cardiac disease.  He had a concussion and hit his head 2 years ago.  Since then he has almost daily episodes of sweating, hard to breathe, feeling hot/cold, palpitations, chest tightness.  He often checks his pulse and tells me that his rate is usually normal.  But he feels like his heart is thumping. These symptoms are usually triggered by driving, being outside in the raf weather, social events, lying down and sometimes sports.  He tells me that driving is the worst.  He takes the back roads and avoids highways.  He tells me that he sometimes feels tunnel vision and mildly dizzy.  Denies syncope.  He feels like standing helps and symptoms are worse when he is sitting.  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  He plays basketball and volleyball.  He tells me that volleyball is fine but when he is increasing his heart rate with basketball, he feels like he needs to sit down otherwise feels like he is going to faint.  The chest discomfort is pretty low in the chest.  He is pointing beneath the rib cages and to the xiphoid area.  Denies reproduction with palpation.    He does not smoke but he is using nicotine patch for the 5 years.  No alcohol abuse.  No drug abuse.  He is on Wellbutrin.  Uses propranolol and hydroxyzine as needed but he does not feel well with propranolol.  No history of hypertension, diabetes or family history of premature CAD.  No prior cardiac work-up.    Patient was recently seen in [facility] orthopedics [place] and recommended to see cardiology.  He was told to not to be involved in sports until cleared by cardiology.      Medications, personal, family, and social  history reviewed with patient and revised.    PAST MEDICAL HISTORY:  Past Medical History:   Diagnosis Date     IBS (irritable bowel syndrome)      Seasonal allergic rhinitis due to pollen        CURRENT MEDICATIONS:  Current Outpatient Medications   Medication Sig Dispense Refill     hydrOXYzine (ATARAX) 25 MG tablet Take 1 tablet (25 mg) by mouth every 6 hours as needed for anxiety 90 tablet 3     propranolol (INDERAL) 10 MG tablet Take 1 tablet (10 mg) by mouth as needed (Anxiety) 30 tablet 3       PAST SURGICAL HISTORY:  Past Surgical History:   Procedure Laterality Date     KNEE SURGERY       SHOULDER SURGERY      left and right      WISDOM TOOTH EXTRACTION         ALLERGIES:   No Known Allergies    FAMILY HISTORY:  Family History   Problem Relation Age of Onset     Thyroid Disease Mother      Pulmonary Embolism Father      Breast Cancer Maternal Grandmother      Cancer Maternal Grandmother      Coronary Artery Disease Paternal Grandmother 52        MI Fatal     Prostate Cancer Paternal Grandfather      Factor V Leiden deficiency Paternal Aunt      Factor V Leiden deficiency Paternal Uncle      Asthma No family hx of      Seizure Disorder No family hx of      Colon Cancer No family hx of      Glaucoma No family hx of      Macular Degeneration No family hx of          SOCIAL HISTORY:  Social History     Tobacco Use     Smoking status: Never     Smokeless tobacco: Current     Types: Snuff   Vaping Use     Vaping status: Never Used   Substance Use Topics     Alcohol use: Not Currently     Comment: socially     Drug use: Not Currently     Types: Marijuana     Comment: socially       ROS:   Constitutional: No fever, chills, or sweats. Weight stable.   Cardiovascular: As per HPI.       Exam:  /76 (BP Location: Left arm, Patient Position: Chair, Cuff Size: Adult Large)   Pulse 65   Wt 106.6 kg (235 lb)   SpO2 97%   BMI 29.37 kg/m    GENERAL APPEARANCE: alert and no distress  HEENT: no icterus, no central  cyanosis  LYMPH/NECK: no adenopathy, no asymmetry, JVP not elevated, no carotid bruits.  RESPIRATORY: lungs clear to auscultation - no rales, rhonchi or wheezes, no use of accessory muscles, no retractions, respirations are unlabored, normal respiratory rate  CARDIOVASCULAR: regular rhythm, normal S1, S2, no S3 or S4 and no murmur, click or rub, precordium quiet with normal PMI.  GI: soft, non tender  EXTREMITIES: peripheral pulses normal, no edema  NEURO: alert, normal speech,and affect  SKIN: no ecchymoses, no rashes     I have reviewed the labs and personally reviewed the imaging below and made my comment in the assessment and plan.    Labs:  CBC RESULTS:   Lab Results   Component Value Date    WBC 5.6 10/07/2021    RBC 5.28 10/07/2021    HGB 16.6 10/07/2021    HCT 46.8 10/07/2021    MCV 89 10/07/2021    MCH 31.4 10/07/2021    MCHC 35.5 10/07/2021    RDW 13.1 10/07/2021     10/07/2021       BMP RESULTS:  Lab Results   Component Value Date     04/07/2022    POTASSIUM 4.0 04/07/2022    CHLORIDE 105 04/07/2022    CO2 31 04/07/2022    ANIONGAP 3 04/07/2022    GLC 85 04/07/2022    BUN 17 04/07/2022    CR 1.11 04/07/2022    GFRESTIMATED >90 04/07/2022    MARYBETH 9.3 04/07/2022        Echocardiogram  No results found for this or any previous visit (from the past 8760 hour(s)).        EKG reviewed in clinic today shows sinus rhythm otherwise normal.    Assessment and Plan:   Patient is being seen today for a multitude of symptoms including chest tightness, palpitations, hard to breathe, sweating, vision issues all started after he had concussion 2 years ago.  The symptoms occur once or twice a day lasting for few minutes.  Usually triggered when he is driving which is the worst and sometimes when he is outside in the sun and during social events.  Today he has normal physical exam.  EKG is normal.  No cardiovascular risk factors.  My suspicion for an underlying cardiac cause is low but we will proceed with  work-up to rule out cardiac etiology.  Chest discomfort appears noncardiac.    Plan:  Zio patch for a week to rule out arrhythmia  Exercise stress echocardiogram  Avoid nicotine patch  If all the tests are normal he can go back to his sports activities    Return to clinic as needed.    Total time spent today for this visit is 60 minutes including precharting, face-to-face clinic visit, review of labs/imaging and medical documentation.    Garima EWING MD  [facility] Division of Cardiology  Pager [phone]

## 2023-07-06 DIAGNOSIS — H31.002 CHOROID SCAR OF LEFT EYE: Primary | ICD-10-CM

## 2023-07-18 ENCOUNTER — OFFICE VISIT (OUTPATIENT)
Dept: OPHTHALMOLOGY | Facility: CLINIC | Age: 33
End: 2023-07-18
Attending: OPHTHALMOLOGY
Payer: COMMERCIAL

## 2023-07-18 DIAGNOSIS — H31.002 CHOROID SCAR OF LEFT EYE: ICD-10-CM

## 2023-07-18 DIAGNOSIS — H31.8 CHOROIDAL LESION: ICD-10-CM

## 2023-07-18 DIAGNOSIS — H31.8 CHOROIDAL GRANULOMA: Primary | ICD-10-CM

## 2023-07-18 PROCEDURE — 99214 OFFICE O/P EST MOD 30 MIN: CPT | Performed by: OPHTHALMOLOGY

## 2023-07-18 PROCEDURE — G0463 HOSPITAL OUTPT CLINIC VISIT: HCPCS | Performed by: OPHTHALMOLOGY

## 2023-07-18 PROCEDURE — 92134 CPTRZ OPH DX IMG PST SGM RTA: CPT | Performed by: OPHTHALMOLOGY

## 2023-07-18 PROCEDURE — 92250 FUNDUS PHOTOGRAPHY W/I&R: CPT | Performed by: OPHTHALMOLOGY

## 2023-07-18 ASSESSMENT — REFRACTION_WEARINGRX
OS_SPHERE: -0.75
OS_AXIS: 160
OD_SPHERE: -0.50
OS_CYLINDER: +0.25
OD_CYLINDER: +0.50
OD_AXIS: 010

## 2023-07-18 ASSESSMENT — TONOMETRY
OD_IOP_MMHG: 18
IOP_METHOD: TONOPEN
OS_IOP_MMHG: 17

## 2023-07-18 ASSESSMENT — CONF VISUAL FIELD
OD_INFERIOR_NASAL_RESTRICTION: 0
OS_SUPERIOR_TEMPORAL_RESTRICTION: 0
OS_NORMAL: 1
OD_INFERIOR_TEMPORAL_RESTRICTION: 0
OS_INFERIOR_TEMPORAL_RESTRICTION: 0
OD_SUPERIOR_NASAL_RESTRICTION: 0
OS_SUPERIOR_NASAL_RESTRICTION: 0
OD_NORMAL: 1
OS_INFERIOR_NASAL_RESTRICTION: 0
OD_SUPERIOR_TEMPORAL_RESTRICTION: 0
METHOD: COUNTING FINGERS

## 2023-07-18 ASSESSMENT — CUP TO DISC RATIO
OS_RATIO: 0.3
OD_RATIO: 0.3

## 2023-07-18 ASSESSMENT — VISUAL ACUITY
OS_CC+: -2
OS_CC: 20/20
METHOD: SNELLEN - LINEAR
OD_CC+: -3
OD_CC: 20/20

## 2023-07-18 ASSESSMENT — SLIT LAMP EXAM - LIDS
COMMENTS: NORMAL
COMMENTS: NORMAL

## 2023-07-18 ASSESSMENT — EXTERNAL EXAM - LEFT EYE: OS_EXAM: NORMAL

## 2023-07-18 ASSESSMENT — EXTERNAL EXAM - RIGHT EYE: OD_EXAM: NORMAL

## 2023-07-18 NOTE — NURSING NOTE
Chief Complaints and History of Present Illnesses   Patient presents with     Retinal Evaluation     Choroidal elevated lesion OS     Chief Complaint(s) and History of Present Illness(es)     Retinal Evaluation            Comments: Choroidal elevated lesion OS          Comments    Pt states vision is the same as last visit. No eye pain today, but pt notes some burning in each eye. No new flashes or floaters.    LIZBET Baron July 18, 2023 7:12 AM

## 2023-07-18 NOTE — PROGRESS NOTES
"CC -  Blurred peripheral vision left eye     INTERVAL HISTORY - Initial visit    HPI -   Conor Matthew is a  33 year old year-old patient presenting for blurred peripheral vision in the left eye > right eye since about 2 yrs ago possibly after a head trauma that is not well remembered but possibly hitting his head to the side of the bed after a night of drinking.     He saw Dr. Phelan 1 yr ago and was diagnosed with post concussion syndrome and photophobia and dizziness due to old head trauma. He recently saw Dr. Edmondson who noted a lesion in left eye retina and referred him for retinal evaluation.   Notes peripheral vision becomes \"jumpy\" after heavy exercise especially under sun light or in heat. Feels dizziness after exercise. Driving is difficult because he \"feels\" he had tunnel vision and it is difficult for him to focus at one thing for long time. Looking at objects at distance \"casandra\" him but looking at near objects is easier. Feels tightness in chest and difficulty breathing when these seeing problems happen.   No family of eye diseases  Works at home, is a salesperson     RETI/NAL IMAGING:  OCT 7/18/23  OD - within normal limits; PHF attached  OS - foveal contour within normal limits and PHF attached; 1x2 DD area of mild RPE elevation and irregularity with increased reflectiveness from mid choroid. No SRF/IRF      ASSESSMENT & PLAN    1. Choroidal granuloma    2. Choroid scar of left eye    3. Choroidal lesion    presented with vague visual symptoms and noted to have a left choroidal lesion  No change compared to OCT taken ~1.5 months ago  DDx; idiopathic type 1 CNV vs choroidal osteoma vs choroidal scar vs choroidal granuloma     No other health issues  Plan for FA with left eye transit with spectralis 55 degree and late with optos next visit  Will run TB, Syphilis, Sarcoidosis, lyme disease, CBC, CMP, ESR, CRP    RTC 3 months for DFE, OCT (30 and 55), FAF, optos, FA with left eye transit with 55 degree " spectralis and late with optos      Complete documentation of historical and exam elements from today's encounter can be found in the full encounter summary report (not reduplicated in this progress note). I personally obtained the chief complaint(s) and history of present illness.  I confirmed and edited as necessary the review of systems, past medical/surgical history, family history, social history, and examination findings as documented by others; and I examined the patient myself. I personally reviewed the relevant tests, images, and reports as documented above. I formulated and edited as necessary the assessment and plan and discussed the findings and management plan with the patient and family.     Lc Frank MD, PhD

## 2023-09-28 DIAGNOSIS — H31.8 CHOROIDAL GRANULOMA: Primary | ICD-10-CM

## 2023-10-10 ENCOUNTER — OFFICE VISIT (OUTPATIENT)
Dept: OPHTHALMOLOGY | Facility: CLINIC | Age: 33
End: 2023-10-10
Attending: OPHTHALMOLOGY
Payer: COMMERCIAL

## 2023-10-10 ENCOUNTER — LAB (OUTPATIENT)
Dept: LAB | Facility: CLINIC | Age: 33
End: 2023-10-10
Payer: COMMERCIAL

## 2023-10-10 DIAGNOSIS — H31.002 CHOROID SCAR OF LEFT EYE: ICD-10-CM

## 2023-10-10 DIAGNOSIS — H31.8 CHOROIDAL GRANULOMA: ICD-10-CM

## 2023-10-10 LAB
ALBUMIN SERPL BCG-MCNC: 4.6 G/DL (ref 3.5–5.2)
ALP SERPL-CCNC: 63 U/L (ref 40–129)
ALT SERPL W P-5'-P-CCNC: 35 U/L (ref 0–70)
ANION GAP SERPL CALCULATED.3IONS-SCNC: 9 MMOL/L (ref 7–15)
AST SERPL W P-5'-P-CCNC: 33 U/L (ref 0–45)
BASO+EOS+MONOS # BLD AUTO: ABNORMAL 10*3/UL
BASO+EOS+MONOS NFR BLD AUTO: ABNORMAL %
BASOPHILS # BLD AUTO: 0 10E3/UL (ref 0–0.2)
BASOPHILS NFR BLD AUTO: 0 %
BILIRUB SERPL-MCNC: 0.4 MG/DL
BUN SERPL-MCNC: 16.6 MG/DL (ref 6–20)
CALCIUM SERPL-MCNC: 9.4 MG/DL (ref 8.6–10)
CHLORIDE SERPL-SCNC: 105 MMOL/L (ref 98–107)
CREAT SERPL-MCNC: 1.23 MG/DL (ref 0.67–1.17)
CRP SERPL-MCNC: <3 MG/L
DEPRECATED HCO3 PLAS-SCNC: 26 MMOL/L (ref 22–29)
EGFRCR SERPLBLD CKD-EPI 2021: 80 ML/MIN/1.73M2
EOSINOPHIL # BLD AUTO: 0 10E3/UL (ref 0–0.7)
EOSINOPHIL NFR BLD AUTO: 1 %
ERYTHROCYTE [DISTWIDTH] IN BLOOD BY AUTOMATED COUNT: 13.2 % (ref 10–15)
ERYTHROCYTE [SEDIMENTATION RATE] IN BLOOD BY WESTERGREN METHOD: 1 MM/HR (ref 0–15)
GLUCOSE SERPL-MCNC: 87 MG/DL (ref 70–99)
HCT VFR BLD AUTO: 46.1 % (ref 40–53)
HGB BLD-MCNC: 15 G/DL (ref 13.3–17.7)
IMM GRANULOCYTES # BLD: 0 10E3/UL
IMM GRANULOCYTES NFR BLD: 0 %
LYMPHOCYTES # BLD AUTO: 1.4 10E3/UL (ref 0.8–5.3)
LYMPHOCYTES NFR BLD AUTO: 33 %
MCH RBC QN AUTO: 28.6 PG (ref 26.5–33)
MCHC RBC AUTO-ENTMCNC: 32.5 G/DL (ref 31.5–36.5)
MCV RBC AUTO: 88 FL (ref 78–100)
MONOCYTES # BLD AUTO: 0.4 10E3/UL (ref 0–1.3)
MONOCYTES NFR BLD AUTO: 9 %
NEUTROPHILS # BLD AUTO: 2.5 10E3/UL (ref 1.6–8.3)
NEUTROPHILS NFR BLD AUTO: 57 %
NRBC # BLD AUTO: 0 10E3/UL
NRBC BLD AUTO-RTO: 0 /100
PLATELET # BLD AUTO: 148 10E3/UL (ref 150–450)
POTASSIUM SERPL-SCNC: 4.2 MMOL/L (ref 3.4–5.3)
PROT SERPL-MCNC: 7 G/DL (ref 6.4–8.3)
RBC # BLD AUTO: 5.25 10E6/UL (ref 4.4–5.9)
SODIUM SERPL-SCNC: 140 MMOL/L (ref 135–145)
WBC # BLD AUTO: 4.3 10E3/UL (ref 4–11)

## 2023-10-10 PROCEDURE — 36415 COLL VENOUS BLD VENIPUNCTURE: CPT

## 2023-10-10 PROCEDURE — 92235 FLUORESCEIN ANGRPH MLTIFRAME: CPT | Performed by: OPHTHALMOLOGY

## 2023-10-10 PROCEDURE — 85025 COMPLETE CBC W/AUTO DIFF WBC: CPT

## 2023-10-10 PROCEDURE — 86140 C-REACTIVE PROTEIN: CPT

## 2023-10-10 PROCEDURE — 86481 TB AG RESPONSE T-CELL SUSP: CPT

## 2023-10-10 PROCEDURE — 92250 FUNDUS PHOTOGRAPHY W/I&R: CPT | Performed by: OPHTHALMOLOGY

## 2023-10-10 PROCEDURE — 92134 CPTRZ OPH DX IMG PST SGM RTA: CPT | Performed by: OPHTHALMOLOGY

## 2023-10-10 PROCEDURE — 80053 COMPREHEN METABOLIC PANEL: CPT

## 2023-10-10 PROCEDURE — 82164 ANGIOTENSIN I ENZYME TEST: CPT | Mod: 90

## 2023-10-10 PROCEDURE — 99207 FUNDUS PHOTOS OU (BOTH EYES): CPT | Mod: 26 | Performed by: OPHTHALMOLOGY

## 2023-10-10 PROCEDURE — 99214 OFFICE O/P EST MOD 30 MIN: CPT | Performed by: OPHTHALMOLOGY

## 2023-10-10 PROCEDURE — 99000 SPECIMEN HANDLING OFFICE-LAB: CPT

## 2023-10-10 PROCEDURE — 86780 TREPONEMA PALLIDUM: CPT

## 2023-10-10 PROCEDURE — 85652 RBC SED RATE AUTOMATED: CPT

## 2023-10-10 PROCEDURE — 99213 OFFICE O/P EST LOW 20 MIN: CPT | Mod: 25 | Performed by: OPHTHALMOLOGY

## 2023-10-10 PROCEDURE — 86618 LYME DISEASE ANTIBODY: CPT

## 2023-10-10 PROCEDURE — 85549 MURAMIDASE: CPT | Mod: 90

## 2023-10-10 ASSESSMENT — VISUAL ACUITY
METHOD: SNELLEN - LINEAR
OD_SC: 20/20
OS_SC: 20/20

## 2023-10-10 ASSESSMENT — CONF VISUAL FIELD
OS_INFERIOR_NASAL_RESTRICTION: 0
OD_INFERIOR_NASAL_RESTRICTION: 0
OD_NORMAL: 1
OS_NORMAL: 1
METHOD: COUNTING FINGERS
OS_SUPERIOR_NASAL_RESTRICTION: 0
OD_SUPERIOR_NASAL_RESTRICTION: 0
OS_SUPERIOR_TEMPORAL_RESTRICTION: 0
OD_INFERIOR_TEMPORAL_RESTRICTION: 0
OS_INFERIOR_TEMPORAL_RESTRICTION: 0
OD_SUPERIOR_TEMPORAL_RESTRICTION: 0

## 2023-10-10 ASSESSMENT — TONOMETRY
OS_IOP_MMHG: 18
OD_IOP_MMHG: 18
IOP_METHOD: TONOPEN

## 2023-10-10 ASSESSMENT — CUP TO DISC RATIO
OS_RATIO: 0.3
OD_RATIO: 0.3

## 2023-10-10 ASSESSMENT — SLIT LAMP EXAM - LIDS
COMMENTS: NORMAL
COMMENTS: NORMAL

## 2023-10-10 ASSESSMENT — EXTERNAL EXAM - RIGHT EYE: OD_EXAM: NORMAL

## 2023-10-10 ASSESSMENT — EXTERNAL EXAM - LEFT EYE: OS_EXAM: NORMAL

## 2023-10-10 NOTE — NURSING NOTE
"Chief Complaints and History of Present Illnesses   Patient presents with    Follow Up     Follow up Choroidal granuloma.  Last seen July 18, 2023.      Chief Complaint(s) and History of Present Illness(es)       Follow Up              Laterality: both eyes    Associated symptoms: double vision (only if staring at an object for a while (Ie,. reading).) and floaters.  Negative for eye pain and flashes    Treatments tried: no treatments    Pain scale: 0/10    Comments: Follow up Choroidal granuloma.  Last seen July 18, 2023.               Comments    Patient reports only new changes in vision is increase in \"dots\" or floaters , more noticeable in bright lit settings. Is unsure which eye if not both.   Blurriness come and goes, is not new per pt. Is not using any eye drops at this time. BE     GIOVANNA Jauregui OA, October 10, 2023                     " no

## 2023-10-10 NOTE — PROGRESS NOTES
"CC -  Blurred peripheral vision left eye     INTERVAL HISTORY - vision stable; no new symptoms; did not get labs done    HPI -   Conor Matthew is a  33 year old year-old patient presenting for blurred peripheral vision in the left eye > right eye since about 2 yrs ago possibly after a head trauma that is not well remembered but possibly hitting his head to the side of the bed after a night of drinking.     He saw Dr. Phelan 1 yr ago and was diagnosed with post concussion syndrome and photophobia and dizziness due to old head trauma. He recently saw Dr. Edmondson who noted a lesion in left eye retina and referred him for retinal evaluation.   Notes peripheral vision becomes \"jumpy\" after heavy exercise especially under sun light or in heat. Feels dizziness after exercise. Driving is difficult because he \"feels\" he had tunnel vision and it is difficult for him to focus at one thing for long time. Looking at objects at distance \"casandra\" him but looking at near objects is easier. Feels tightness in chest and difficulty breathing when these seeing problems happen.   No family of eye diseases  Works at home, is a salesperson     RETI/NAL IMAGING:  OCT 7/18/23 and 10/10/23  OD - within normal limits; PHF attached  OS - foveal contour within normal limits and PHF attached; 1x2 DD area of mild RPE elevation and irregularity with increased reflectiveness from mid choroid. No SRF/IRF; stable     FA 10/10/23  Right eye within normal limits   Left eye normal transit time; lesion showed up in choroidal phase; F increased minimally but stayed mostly stable until the late phase      ASSESSMENT & PLAN    1. Choroidal granuloma    presented with vague visual symptoms and noted to have a left choroidal lesion  No change compared to OCT taken ~1.5 months ago  Ddx: idiopathic type 1 CNV vs choroidal scar vs choroidal granuloma    No other health issues  FA compatible with stable long standing choroidal scar with overlying RPE atrophy  Will " do TB, Syphilis, Sarcoidosis, lyme disease, CBC, CMP, ESR, CRP (ordered last time); will call Conor if any abnormal results    RTC  8-12 months for VTD/optos/OCT ou       Complete documentation of historical and exam elements from today's encounter can be found in the full encounter summary report (not reduplicated in this progress note). I personally obtained the chief complaint(s) and history of present illness.  I confirmed and edited as necessary the review of systems, past medical/surgical history, family history, social history, and examination findings as documented by others; and I examined the patient myself. I personally reviewed the relevant tests, images, and reports as documented above. I formulated and edited as necessary the assessment and plan and discussed the findings and management plan with the patient and family.     Lc Frank MD, PhD

## 2023-10-11 LAB
B BURGDOR IGG+IGM SER QL: 0.04
T PALLIDUM AB SER QL: NONREACTIVE

## 2023-10-12 LAB
ACE SERPL-CCNC: 52 U/L
GAMMA INTERFERON BACKGROUND BLD IA-ACNC: 0 IU/ML
M TB IFN-G BLD-IMP: NEGATIVE
M TB IFN-G CD4+ BCKGRND COR BLD-ACNC: 10 IU/ML
MITOGEN IGNF BCKGRD COR BLD-ACNC: 0 IU/ML
MITOGEN IGNF BCKGRD COR BLD-ACNC: 0 IU/ML
QUANTIFERON MITOGEN: 10 IU/ML
QUANTIFERON NIL TUBE: 0 IU/ML
QUANTIFERON TB1 TUBE: 0 IU/ML
QUANTIFERON TB2 TUBE: 0

## 2023-10-14 LAB — LYSOZYME SERPL-MCNC: 2.57 UG/ML

## 2023-11-17 ENCOUNTER — TELEPHONE (OUTPATIENT)
Dept: FAMILY MEDICINE | Facility: CLINIC | Age: 33
End: 2023-11-17

## 2023-11-17 NOTE — TELEPHONE ENCOUNTER
Needs a face to face visit- NOT ok for same day- I have not seen patient since June 2022 - any provider is fine

## 2023-11-17 NOTE — TELEPHONE ENCOUNTER
"Patient calling and requesting if numerous labs can be ordered as he has had issues he's been dealing with for over 2 years now. He had a concussion 2 years ago and ever since, he states he suffers from panic attacks while driving at the wheel. He states he's seen various specialties for help. \"At this point, it's been 2 years since and I want to rule anything out at this point\" and wanting an in depth look at his hormones to see if this could be causing his concerns. He states a family member deals with a lot of health background and was recommend the following listed labs to be drawn and patient was wanting providers thoughts if he can get the following labs drawn:    Free testosterone with SHBG and albumin  Testosterone  Estradiol  HS-CRP  Comp. Metabolic Panel  CBC  Lipid Panel  C reactive protein  TSH 3rd generation  T3  T4  25 - Hydroxy Vitamin D  HbA1c  Insulin  Apolipoprotein B  Calcitriol 1-25 Dihydroxy Vitamin D  Iron and total blood iron binding capacity (TBIC)    Routing to provider to review and revise if these labs are necessary / can be done per patient request.    Josef Ann RN  [facility]      "

## 2023-11-28 ENCOUNTER — MYC MEDICAL ADVICE (OUTPATIENT)
Dept: FAMILY MEDICINE | Facility: CLINIC | Age: 33
End: 2023-11-28

## 2023-11-28 ENCOUNTER — VIRTUAL VISIT (OUTPATIENT)
Dept: FAMILY MEDICINE | Facility: CLINIC | Age: 33
End: 2023-11-28
Payer: COMMERCIAL

## 2023-11-28 DIAGNOSIS — Z13.220 SCREENING FOR HYPERLIPIDEMIA: ICD-10-CM

## 2023-11-28 DIAGNOSIS — Z13.21 ENCOUNTER FOR VITAMIN DEFICIENCY SCREENING: ICD-10-CM

## 2023-11-28 DIAGNOSIS — R53.83 FATIGUE, UNSPECIFIED TYPE: Primary | ICD-10-CM

## 2023-11-28 DIAGNOSIS — R68.82 LOW LIBIDO: ICD-10-CM

## 2023-11-28 DIAGNOSIS — Z13.1 SCREENING FOR DIABETES MELLITUS: ICD-10-CM

## 2023-11-28 PROCEDURE — 99213 OFFICE O/P EST LOW 20 MIN: CPT | Mod: VID | Performed by: PHYSICIAN ASSISTANT

## 2023-11-28 NOTE — PROGRESS NOTES
"    Instructions Relayed to Patient by Virtual Roomer:     Patient is active on [employer]:   Relayed following to patient: \"It looks like you are active on [employer], are you able to join the visit this way? If not, do you need us to send you a link now or would you like your provider to send a link via text or email when they are ready to initiate the visit?\"    Reminded patient to ensure they were logged on to virtual visit by arrival time listed. Documented in appointment notes if patient had flexibility to initiate visit sooner than arrival time. If pediatric virtual visit, ensured pediatric patient along with parent/guardian will be present for video visit.     Patient offered the website [email or web address] and/or phone number to [employer] Help line: [phone]   Conor is a 33 year old who is being evaluated via a billable video visit.      How would you like to obtain your AVS? [employer]  If the video visit is dropped, the invitation should be resent by: Text to cell phone: [phone]  Will anyone else be joining your video visit? No          Assessment & Plan     Fatigue, unspecified type  Patient is requesting full lab workup prior to face to face visit   - REVIEW OF HEALTH MAINTENANCE PROTOCOL ORDERS  - Apolipoprotein B  - Testosterone Free and Total  - Estradiol  - Insulin level  - CRP cardiac risk  - TSH  - T3 total  - T3 Free  - ANTI THYROGLOBULIN ANTIBODY  - THYROID PEROXIDASE ANTIBODY  - T4 free  - CBC with platelets and differential  - CRP, inflammation  - Iron and iron binding capacity  - Vitamin B12    Low libido    - Estradiol  - Insulin level  - CRP cardiac risk  - TSH  - T3 total  - T3 Free  - ANTI THYROGLOBULIN ANTIBODY  - THYROID PEROXIDASE ANTIBODY  - T4 free  - CBC with platelets and differential  - CRP, inflammation  - Iron and iron binding capacity  - Vitamin B12    Screening for diabetes mellitus    - Hemoglobin A1c  - Comprehensive metabolic panel (BMP + Alb, Alk Phos, ALT, " AST, Total. Bili, TP)    Screening for hyperlipidemia    - Lipid panel reflex to direct LDL Fasting    Encounter for vitamin deficiency screening    - 25 OH Vit D therapy monitoring      Ordering of each unique test             TARAS Naylor [facility] RONALD Perdomo is a 33 year old, presenting for the following health issues:  No chief complaint on file.      History of Present Illness       Reason for visit:  Blood Work    He eats 2-3 servings of fruits and vegetables daily.He consumes 0 sweetened beverage(s) daily.He exercises with enough effort to increase his heart rate 60 or more minutes per day.  He exercises with enough effort to increase his heart rate 4 days per week.   He is taking medications regularly.    Patient would like to go over blood work from labs     Patient whom I have not seen since 5/2022 presents requesting labs.  Called in other day requesting numerous lab- working [facility].   Keeping track of symptoms over the last 2 yrs.  Worries may have Low testoserone and stuff and mentally foggy.  Reduced sex drive. Had been diagnosed with anxiety and depression - had seen psychiatry- wants numerous blood tests to see where levels are at.   Lipids  Comprehensive metabolic panel   Lipids  Testosterone.   Apolipotrotein B   Free testosterone with SHBG and albumin  Testosterone  Estradiol  HS-CRP  Comp. Metabolic Panel  CBC  Lipid Panel  C reactive protein  TSH 3rd generation  T3  T4  25 - Hydroxy Vitamin D  HbA1c  Insulin  Apolipoprotein B  Calcitriol 1-25 Dihydroxy Vitamin D  Iron and total blood iron binding capacity (TBIC)  Had consulted with a friend in the medical field.         Review of Systems         Objective           Vitals:  No vitals were obtained today due to virtual visit.    Physical Exam   GENERAL: Healthy, alert and no distress  EYES: Eyes grossly normal to inspection.  No discharge or erythema, or obvious  scleral/conjunctival abnormalities.  RESP: No audible wheeze, cough, or visible cyanosis.  No visible retractions or increased work of breathing.    SKIN: Visible skin clear. No significant rash, abnormal pigmentation or lesions.  NEURO: Cranial nerves grossly intact.  Mentation and speech appropriate for age.  PSYCH: Mentation appears normal, affect normal/bright, judgement and insight intact, normal speech and appearance well-groomed.      No charge - needs a face to face visit- scheduled for next week           Video-Visit Details    Type of service:  Video Visit     Originating Location (pt. Location): Home    Distant Location (provider location):  On-site  Platform used for Video Visit: Marcelo

## 2023-12-01 ENCOUNTER — LAB (OUTPATIENT)
Dept: LAB | Facility: CLINIC | Age: 33
End: 2023-12-01
Payer: COMMERCIAL

## 2023-12-01 DIAGNOSIS — Z13.1 SCREENING FOR DIABETES MELLITUS: ICD-10-CM

## 2023-12-01 DIAGNOSIS — R68.82 LOW LIBIDO: ICD-10-CM

## 2023-12-01 DIAGNOSIS — Z13.220 SCREENING FOR HYPERLIPIDEMIA: ICD-10-CM

## 2023-12-01 DIAGNOSIS — R53.83 FATIGUE, UNSPECIFIED TYPE: ICD-10-CM

## 2023-12-01 DIAGNOSIS — Z13.21 ENCOUNTER FOR VITAMIN DEFICIENCY SCREENING: ICD-10-CM

## 2023-12-01 LAB
ALBUMIN SERPL BCG-MCNC: 4.6 G/DL (ref 3.5–5.2)
ALP SERPL-CCNC: 69 U/L (ref 40–150)
ALT SERPL W P-5'-P-CCNC: 31 U/L (ref 0–70)
ANION GAP SERPL CALCULATED.3IONS-SCNC: 10 MMOL/L (ref 7–15)
AST SERPL W P-5'-P-CCNC: 24 U/L (ref 0–45)
BASOPHILS # BLD AUTO: 0 10E3/UL (ref 0–0.2)
BASOPHILS NFR BLD AUTO: 0 %
BILIRUB SERPL-MCNC: 0.3 MG/DL
BUN SERPL-MCNC: 20.5 MG/DL (ref 6–20)
CALCIUM SERPL-MCNC: 8.9 MG/DL (ref 8.6–10)
CHLORIDE SERPL-SCNC: 103 MMOL/L (ref 98–107)
CHOLEST SERPL-MCNC: 185 MG/DL
CREAT SERPL-MCNC: 1.21 MG/DL (ref 0.67–1.17)
CREAT UR-MCNC: 221 MG/DL
CRP SERPL HS-MCNC: 3.22 MG/L
CRP SERPL-MCNC: 3.15 MG/L
DEPRECATED HCO3 PLAS-SCNC: 28 MMOL/L (ref 22–29)
EGFRCR SERPLBLD CKD-EPI 2021: 82 ML/MIN/1.73M2
EOSINOPHIL # BLD AUTO: 0 10E3/UL (ref 0–0.7)
EOSINOPHIL NFR BLD AUTO: 1 %
ERYTHROCYTE [DISTWIDTH] IN BLOOD BY AUTOMATED COUNT: 12.7 % (ref 10–15)
ESTRADIOL SERPL-MCNC: 29 PG/ML
GLUCOSE SERPL-MCNC: 88 MG/DL (ref 70–99)
HBA1C MFR BLD: 5.1 % (ref 0–5.6)
HCT VFR BLD AUTO: 47.2 % (ref 40–53)
HDLC SERPL-MCNC: 40 MG/DL
HGB BLD-MCNC: 15.6 G/DL (ref 13.3–17.7)
IMM GRANULOCYTES # BLD: 0 10E3/UL
IMM GRANULOCYTES NFR BLD: 0 %
INSULIN SERPL-ACNC: 10.9 UU/ML (ref 2.6–24.9)
IRON BINDING CAPACITY (ROCHE): 302 UG/DL (ref 240–430)
IRON SATN MFR SERPL: 27 % (ref 15–46)
IRON SERPL-MCNC: 83 UG/DL (ref 61–157)
LDLC SERPL CALC-MCNC: 118 MG/DL
LYMPHOCYTES # BLD AUTO: 1.7 10E3/UL (ref 0.8–5.3)
LYMPHOCYTES NFR BLD AUTO: 36 %
MCH RBC QN AUTO: 29 PG (ref 26.5–33)
MCHC RBC AUTO-ENTMCNC: 33.1 G/DL (ref 31.5–36.5)
MCV RBC AUTO: 88 FL (ref 78–100)
MICROALBUMIN UR-MCNC: <12 MG/L
MICROALBUMIN/CREAT UR: NORMAL MG/G{CREAT}
MONOCYTES # BLD AUTO: 0.4 10E3/UL (ref 0–1.3)
MONOCYTES NFR BLD AUTO: 8 %
NEUTROPHILS # BLD AUTO: 2.6 10E3/UL (ref 1.6–8.3)
NEUTROPHILS NFR BLD AUTO: 55 %
NONHDLC SERPL-MCNC: 145 MG/DL
NRBC # BLD AUTO: 0 10E3/UL
NRBC BLD AUTO-RTO: 0 /100
PLATELET # BLD AUTO: 171 10E3/UL (ref 150–450)
POTASSIUM SERPL-SCNC: 4.1 MMOL/L (ref 3.4–5.3)
PROT SERPL-MCNC: 7.2 G/DL (ref 6.4–8.3)
RBC # BLD AUTO: 5.38 10E6/UL (ref 4.4–5.9)
SHBG SERPL-SCNC: 29 NMOL/L (ref 11–80)
SODIUM SERPL-SCNC: 141 MMOL/L (ref 135–145)
T3 SERPL-MCNC: 115 NG/DL (ref 85–202)
T3FREE SERPL-MCNC: 3.5 PG/ML (ref 2–4.4)
T4 FREE SERPL-MCNC: 1.2 NG/DL (ref 0.9–1.7)
TRIGL SERPL-MCNC: 134 MG/DL
TSH SERPL DL<=0.005 MIU/L-ACNC: 4.43 UIU/ML (ref 0.3–4.2)
VIT B12 SERPL-MCNC: 709 PG/ML (ref 232–1245)
WBC # BLD AUTO: 4.8 10E3/UL (ref 4–11)

## 2023-12-01 PROCEDURE — 82670 ASSAY OF TOTAL ESTRADIOL: CPT

## 2023-12-01 PROCEDURE — 86800 THYROGLOBULIN ANTIBODY: CPT

## 2023-12-01 PROCEDURE — 83540 ASSAY OF IRON: CPT

## 2023-12-01 PROCEDURE — 82570 ASSAY OF URINE CREATININE: CPT

## 2023-12-01 PROCEDURE — 82043 UR ALBUMIN QUANTITATIVE: CPT

## 2023-12-01 PROCEDURE — 36415 COLL VENOUS BLD VENIPUNCTURE: CPT

## 2023-12-01 PROCEDURE — 84439 ASSAY OF FREE THYROXINE: CPT

## 2023-12-01 PROCEDURE — 80053 COMPREHEN METABOLIC PANEL: CPT

## 2023-12-01 PROCEDURE — 82607 VITAMIN B-12: CPT

## 2023-12-01 PROCEDURE — 86376 MICROSOMAL ANTIBODY EACH: CPT

## 2023-12-01 PROCEDURE — 82306 VITAMIN D 25 HYDROXY: CPT

## 2023-12-01 PROCEDURE — 84270 ASSAY OF SEX HORMONE GLOBUL: CPT

## 2023-12-01 PROCEDURE — 83525 ASSAY OF INSULIN: CPT

## 2023-12-01 PROCEDURE — 84403 ASSAY OF TOTAL TESTOSTERONE: CPT

## 2023-12-01 PROCEDURE — 83550 IRON BINDING TEST: CPT

## 2023-12-01 PROCEDURE — 80061 LIPID PANEL: CPT

## 2023-12-01 PROCEDURE — 84443 ASSAY THYROID STIM HORMONE: CPT

## 2023-12-01 PROCEDURE — 84481 FREE ASSAY (FT-3): CPT

## 2023-12-01 PROCEDURE — 82172 ASSAY OF APOLIPOPROTEIN: CPT | Mod: 90

## 2023-12-01 PROCEDURE — 85025 COMPLETE CBC W/AUTO DIFF WBC: CPT

## 2023-12-01 PROCEDURE — 83036 HEMOGLOBIN GLYCOSYLATED A1C: CPT

## 2023-12-01 PROCEDURE — 86141 C-REACTIVE PROTEIN HS: CPT

## 2023-12-04 LAB
APO B100 SERPL-MCNC: 115 MG/DL
THYROGLOB AB SERPL IA-ACNC: <20 IU/ML
THYROPEROXIDASE AB SERPL-ACNC: <10 IU/ML

## 2023-12-04 NOTE — RESULT ENCOUNTER NOTE
Conor,    The TSH thyroid test was slightly outside of the normal range.  However the additional thyroid antibodies were both negative.  This makes significant thyroid problems less likely.    Your cholesterol panel looks much better than last year with improvement of the LDL (bad cholesterol).  Please continue with lifestyle measures to keep your cholesterol down. Recommendations to reduce cholesterol and cardiovascular risks:  Diet:  -Try to eat more vegetables, fruits, legumes, nuts/seeds, whole grains, and fish.  - try to eat less red meat, processed meats, processed foods, sweetened beverages.   - try to replace saturated fat in your diet with mono- and poly-unsaturated fats   Exercise:  Aim for regular exercise with a goal of 150 min of moderate to high intensity aerobic exercise per week     Kidney, liver and electrolyte panel overall looks okay.  Sometimes the urea nitrogen and creatinine levels can go up with certain supplements or dehydration.  Make sure you are drinking plenty of fluids.    The apolipoprotein B level was normal  The estradiol and sex hormone binding level level were normal.  The C-reactive protein level returned in the high risk zone.  The CRP inflammation test was normal.  This will need to be reviewed with Mimi at your next visit.  Vitamin B12 level was normal.  Iron and blood count levels were normal.  Diabetes screening A1c test was normal.    Urine protein level was normal    The vitamin D level and testosterone levels are still processing.  You will be updated in a separate message with those results.    Please MyChart or call if you have any concerns or questions.   Sincerely,  Elizabeth Narvaez MD  (for LAUREL Roland who is out of the office today)

## 2023-12-05 ENCOUNTER — OFFICE VISIT (OUTPATIENT)
Dept: FAMILY MEDICINE | Facility: CLINIC | Age: 33
End: 2023-12-05
Payer: COMMERCIAL

## 2023-12-05 VITALS
SYSTOLIC BLOOD PRESSURE: 117 MMHG | HEIGHT: 75 IN | BODY MASS INDEX: 29.44 KG/M2 | OXYGEN SATURATION: 99 % | HEART RATE: 73 BPM | RESPIRATION RATE: 14 BRPM | WEIGHT: 236.8 LBS | TEMPERATURE: 97.5 F | DIASTOLIC BLOOD PRESSURE: 76 MMHG

## 2023-12-05 DIAGNOSIS — R53.83 FATIGUE, UNSPECIFIED TYPE: Primary | ICD-10-CM

## 2023-12-05 LAB
DEPRECATED CALCIDIOL+CALCIFEROL SERPL-MC: <34 UG/L (ref 20–75)
TESTOST FREE SERPL-MCNC: 9.13 NG/DL
TESTOST SERPL-MCNC: 415 NG/DL (ref 240–950)
VITAMIN D2 SERPL-MCNC: <5 UG/L
VITAMIN D3 SERPL-MCNC: 29 UG/L

## 2023-12-05 PROCEDURE — 99214 OFFICE O/P EST MOD 30 MIN: CPT | Performed by: PHYSICIAN ASSISTANT

## 2023-12-05 RX ORDER — LORAZEPAM 0.5 MG/1
0.5 TABLET ORAL EVERY 6 HOURS PRN
COMMUNITY
Start: 2023-08-08 | End: 2024-08-13

## 2023-12-05 ASSESSMENT — PAIN SCALES - GENERAL: PAINLEVEL: NO PAIN (0)

## 2023-12-05 NOTE — RESULT ENCOUNTER NOTE
Dear Conor  Your vitamin D level was normal.  I recommend continuing a multivitamin and vitamin D if you are taking and discontinue other supplements.   Please call or MyChart my office with any questions or concerns.   Mimi Saldivar, PAC

## 2023-12-05 NOTE — PATIENT INSTRUCTIONS
Follow up with us as needed   Return urgently if any change in symptoms.    I would discontinue supplements

## 2023-12-05 NOTE — PROGRESS NOTES
"  Assessment & Plan     Fatigue, unspecified type  Without identifiable cause  Encouraged to discontinue supplements other than multivitamin   vitaminD level still in process      Review of the result(s) of each unique test - micraolbumin, b12, comprehensive metabolic panel, iron and tibc, crp, cbc, thyroid labs, crp, A1c, insulin,lipids,testosterone  Ordering of each unique test         BMI:   Estimated body mass index is 29.6 kg/m  as calculated from the following:    Height as of this encounter: 1.905 m (6' 3\").    Weight as of this encounter: 107.4 kg (236 lb 12.8 oz).   Weight management plan: Discussed healthy diet and exercise guidelines    Patient Instructions   Follow up with us as needed   Return urgently if any change in symptoms.    I would discontinue supplements     Mimi Saldivar PA-C  [facility] RONALD Perdomo is a 33 year old, presenting for the following health issues:  No chief complaint on file.        12/5/2023    12:43 PM   Additional Questions   Roomed by Julianna Esquivel   Accompanied by self       History of Present Illness       Reason for visit:  Blood Work    He eats 2-3 servings of fruits and vegetables daily.He consumes 0 sweetened beverage(s) daily.He exercises with enough effort to increase his heart rate 60 or more minutes per day.  He exercises with enough effort to increase his heart rate 4 days per week.   He is taking medications regularly.           Patient presents for follow up of lab results.  Complains of fatigue and low libido and wanted a bunch of stuff ruled out and requested several labs.   Is followed by a psychiatrist and on wellbutrin.   Keeping log of symptoms   Always Feels like could sleep longer -  Recently stopped using nicotine completely  Was using nicotine for long time   Sleeping a little better throughout the night.  Is taking multivitamin and a lot of over the counter supplements- including fish oil, etc recommended by family " "member.  Trying to be more health last 2 yrs  Doesn't drink alcohol anymore   Trying to get gym everyday.   Admittedly More motivated to do things.   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      Objective    /76 (BP Location: Right arm, Patient Position: Sitting, Cuff Size: Adult Large)   Pulse 73   Temp 97.5  F (36.4  C) (Temporal)   Resp 14   Ht 1.905 m (6' 3\")   Wt 107.4 kg (236 lb 12.8 oz)   SpO2 99%   BMI 29.60 kg/m    Body mass index is 29.6 kg/m .  Physical Exam   GENERAL: healthy, alert and no distress  NECK: no adenopathy, no asymmetry, masses, or scars and thyroid normal to palpation  RESP: lungs clear to auscultation - no rales, rhonchi or wheezes  CV: regular rate and rhythm, normal S1 S2, no S3 or S4, no murmur, click or rub, no peripheral edema and peripheral pulses strong  ABDOMEN: soft, nontender, no hepatosplenomegaly, no masses and bowel sounds normal  MS: no gross musculoskeletal defects noted, no edema  PSYCH: mentation appears normal, affect normal/bright, judgement and insight intact, and appearance well groomed    Lab on 12/01/2023   Component Date Value Ref Range Status    Apolipoprotein B 12/01/2023 115  66 - 133 mg/dL Final    REFERENCE INTERVAL: Apolipoprotein B    A desirable fasting serum Apo B concentration for the   prevention of atherosclerotic cardiovascular disease in   adults is less than 90 mg/dL.  A fasting serum Apo B   concentration of 130 mg/dL or greater corresponds to a LDL   cholesterol concentration greater than 160 mg/dL and   constitutes a risk enhancing factor for atherosclerotic   cardiovascular disease in adults.  Performed By: [facility]  [address] [place] [id number]  : Av Skelton MD, PhD  CLIA Number: [id number]    Cholesterol " 12/01/2023 185  <200 mg/dL Final    Triglycerides 12/01/2023 134  <150 mg/dL Final    Direct Measure HDL 12/01/2023 40  >=40 mg/dL Final    LDL Cholesterol Calculated 12/01/2023 118 (H)  <=100 mg/dL Final    Non HDL Cholesterol 12/01/2023 145 (H)  <130 mg/dL Final    Estradiol 12/01/2023 29  pg/mL Final    Healthy Men:   11.3-43.2 pg/mL    Healthy Postmenopausal Women:  Postmenopause: <5-138 pg/mL    Healthy Pregnant Women:  1st trimester: 154-3243 pg/mL  2nd trimester: 1561-54018 pg/mL  3rd trimester: 8525->65756 pg/mL    Healthy Women Cycle Phase:  Follicular: 30.9-90.4 pg/mL  Ovulation: 60.4-533 pg/mL  Luteal: 60.4-232 pg/mL    Healthy Women Cycle Sub-Phase:  Early Follicular: 20.5-62.8 pg/mL  Intermediate Follicular: 26-79.8 pg/mL  Late Follicular: 49.5-233 pg/mL  Ovulation: 60.4-602 pg/mL  Early Luteal: 51.1-179 pg/mL  Intermediate Luteal: 66.5-305 pg/mL  Late Luteal: 30.2-222 pg/mL    Insulin 12/01/2023 10.9  2.6 - 24.9 uU/mL Final    Hemoglobin A1C 12/01/2023 5.1  0.0 - 5.6 % Final    Normal <5.7%   Prediabetes 5.7-6.4%    Diabetes 6.5% or higher     Note: Adopted from ADA consensus guidelines.    C-Reactive Protein High Sensitivity 12/01/2023 3.22   Final    Low risk < 1.0 mg/L   Average risk 1.0 to 3.0 mg/L   High risk > 3.0 mg/L    TSH 12/01/2023 4.43 (H)  0.30 - 4.20 uIU/mL Final    T3 Total 12/01/2023 115  85 - 202 ng/dL Final    T3 Free 12/01/2023 3.5  2.0 - 4.4 pg/mL Final    Thyroglobulin Antibody 12/01/2023 <20  <40 IU/mL Final    Thyroid Peroxidase Antibody 12/01/2023 <10  <35 IU/mL Final    Free T4 12/01/2023 1.20  0.90 - 1.70 ng/dL Final    CRP Inflammation 12/01/2023 3.15  <5.00 mg/L Final    Iron 12/01/2023 83  61 - 157 ug/dL Final    Iron Binding Capacity 12/01/2023 302  240 - 430 ug/dL Final    Iron Sat Index 12/01/2023 27  15 - 46 % Final    Sodium 12/01/2023 141  135 - 145 mmol/L Final    Reference intervals for this test were updated on 09/26/2023 to more accurately reflect our healthy  population. There may be differences in the flagging of prior results with similar values performed with this method. Interpretation of those prior results can be made in the context of the updated reference intervals.     Potassium 12/01/2023 4.1  3.4 - 5.3 mmol/L Final    Carbon Dioxide (CO2) 12/01/2023 28  22 - 29 mmol/L Final    Anion Gap 12/01/2023 10  7 - 15 mmol/L Final    Urea Nitrogen 12/01/2023 20.5 (H)  6.0 - 20.0 mg/dL Final    Creatinine 12/01/2023 1.21 (H)  0.67 - 1.17 mg/dL Final    GFR Estimate 12/01/2023 82  >60 mL/min/1.73m2 Final    Calcium 12/01/2023 8.9  8.6 - 10.0 mg/dL Final    Chloride 12/01/2023 103  98 - 107 mmol/L Final    Glucose 12/01/2023 88  70 - 99 mg/dL Final    Alkaline Phosphatase 12/01/2023 69  40 - 150 U/L Final    Reference intervals for this test were updated on 11/14/2023 to more accurately reflect our healthy population. There may be differences in the flagging of prior results with similar values performed with this method. Interpretation of those prior results can be made in the context of the updated reference intervals.    AST 12/01/2023 24  0 - 45 U/L Final    Reference intervals for this test were updated on 6/12/2023 to more accurately reflect our healthy population. There may be differences in the flagging of prior results with similar values performed with this method. Interpretation of those prior results can be made in the context of the updated reference intervals.    ALT 12/01/2023 31  0 - 70 U/L Final    Reference intervals for this test were updated on 6/12/2023 to more accurately reflect our healthy population. There may be differences in the flagging of prior results with similar values performed with this method. Interpretation of those prior results can be made in the context of the updated reference intervals.      Protein Total 12/01/2023 7.2  6.4 - 8.3 g/dL Final    Albumin 12/01/2023 4.6  3.5 - 5.2 g/dL Final    Bilirubin Total 12/01/2023 0.3  <=1.2  mg/dL Final    Vitamin B12 12/01/2023 709  232 - 1,245 pg/mL Final    Creatinine Urine mg/dL 12/01/2023 221.0  mg/dL Final    The reference ranges have not been established in urine creatinine. The results should be integrated into the clinical context for interpretation.    Albumin Urine mg/L 12/01/2023 <12.0  mg/L Final    The reference ranges have not been established in urine albumin. The results should be integrated into the clinical context for interpretation.    Albumin Urine mg/g Cr 12/01/2023    Final    Unable to calculate, urine albumin and/or urine creatinine is outside detectable limits.  Microalbuminuria is defined as an albumin:creatinine ratio of 17 to 299 for males and 25 to 299 for females. A ratio of albumin:creatinine of 300 or higher is indicative of overt proteinuria.  Due to biologic variability, positive results should be confirmed by a second, first-morning random or 24-hour timed urine specimen. If there is discrepancy, a third specimen is recommended. When 2 out of 3 results are in the microalbuminuria range, this is evidence for incipient nephropathy and warrants increased efforts at glucose control, blood pressure control, and institution of therapy with an angiotensin-converting-enzyme (ACE) inhibitor (if the patient can tolerate it).      Sex Hormone Binding Globulin 12/01/2023 29  11 - 80 nmol/L Final    Free Testosterone Calculated 12/01/2023 9.13  ng/dL Final    Male Jed Ranges:  Jed Stage I: Less than or equal to 0.37 ng/dL  Jed Stage II: 0.03-2.1 ng/dL  Jed Stage III: 0.10-9.8 ng/dL  Jed Stage IV: 3.5-16.9 ng/dL  Jed Stage V: 4.1-23.9 ng/dL    Testosterone Total 12/01/2023 415  240 - 950 ng/dL Final    WBC Count 12/01/2023 4.8  4.0 - 11.0 10e3/uL Final    RBC Count 12/01/2023 5.38  4.40 - 5.90 10e6/uL Final    Hemoglobin 12/01/2023 15.6  13.3 - 17.7 g/dL Final    Hematocrit 12/01/2023 47.2  40.0 - 53.0 % Final    MCV 12/01/2023 88  78 - 100 fL Final    MCH  12/01/2023 29.0  26.5 - 33.0 pg Final    MCHC 12/01/2023 33.1  31.5 - 36.5 g/dL Final    RDW 12/01/2023 12.7  10.0 - 15.0 % Final    Platelet Count 12/01/2023 171  150 - 450 10e3/uL Final    % Neutrophils 12/01/2023 55  % Final    % Lymphocytes 12/01/2023 36  % Final    % Monocytes 12/01/2023 8  % Final    % Eosinophils 12/01/2023 1  % Final    % Basophils 12/01/2023 0  % Final    % Immature Granulocytes 12/01/2023 0  % Final    NRBCs per 100 WBC 12/01/2023 0  <1 /100 Final    Absolute Neutrophils 12/01/2023 2.6  1.6 - 8.3 10e3/uL Final    Absolute Lymphocytes 12/01/2023 1.7  0.8 - 5.3 10e3/uL Final    Absolute Monocytes 12/01/2023 0.4  0.0 - 1.3 10e3/uL Final    Absolute Eosinophils 12/01/2023 0.0  0.0 - 0.7 10e3/uL Final    Absolute Basophils 12/01/2023 0.0  0.0 - 0.2 10e3/uL Final    Absolute Immature Granulocytes 12/01/2023 0.0  <=0.4 10e3/uL Final    Absolute NRBCs 12/01/2023 0.0  10e3/uL Final

## 2024-03-09 ENCOUNTER — HEALTH MAINTENANCE LETTER (OUTPATIENT)
Age: 34
End: 2024-03-09

## 2024-07-29 DIAGNOSIS — H31.002 CHOROID SCAR OF LEFT EYE: Primary | ICD-10-CM

## 2024-08-13 ENCOUNTER — OFFICE VISIT (OUTPATIENT)
Dept: OPHTHALMOLOGY | Facility: CLINIC | Age: 34
End: 2024-08-13
Attending: OPHTHALMOLOGY
Payer: COMMERCIAL

## 2024-08-13 DIAGNOSIS — H52.13 MYOPIA OF BOTH EYES: ICD-10-CM

## 2024-08-13 DIAGNOSIS — H31.002 CHOROID SCAR OF LEFT EYE: ICD-10-CM

## 2024-08-13 DIAGNOSIS — H53.10 SUBJECTIVE VISION DISTURBANCE, BILATERAL: ICD-10-CM

## 2024-08-13 DIAGNOSIS — H31.8 CHOROIDAL GRANULOMA: Primary | ICD-10-CM

## 2024-08-13 PROCEDURE — 99207 FUNDUS PHOTOS OU (BOTH EYES): CPT | Mod: 26 | Performed by: OPHTHALMOLOGY

## 2024-08-13 PROCEDURE — 99213 OFFICE O/P EST LOW 20 MIN: CPT | Performed by: OPHTHALMOLOGY

## 2024-08-13 PROCEDURE — 92250 FUNDUS PHOTOGRAPHY W/I&R: CPT | Performed by: OPHTHALMOLOGY

## 2024-08-13 PROCEDURE — 92134 CPTRZ OPH DX IMG PST SGM RTA: CPT | Performed by: OPHTHALMOLOGY

## 2024-08-13 PROCEDURE — 99214 OFFICE O/P EST MOD 30 MIN: CPT | Mod: GC | Performed by: OPHTHALMOLOGY

## 2024-08-13 RX ORDER — LORAZEPAM 0.5 MG/1
0.5 TABLET ORAL EVERY 6 HOURS PRN
COMMUNITY

## 2024-08-13 RX ORDER — BUPROPION HYDROCHLORIDE 300 MG/1
300 TABLET ORAL DAILY
COMMUNITY
Start: 2024-07-05

## 2024-08-13 ASSESSMENT — VISUAL ACUITY
OD_SC+: -3
METHOD: SNELLEN - LINEAR
OD_SC: 20/15
OS_SC: 20/20

## 2024-08-13 ASSESSMENT — CONF VISUAL FIELD
OS_SUPERIOR_TEMPORAL_RESTRICTION: 0
OS_NORMAL: 1
OD_NORMAL: 1
OD_INFERIOR_TEMPORAL_RESTRICTION: 0
OD_SUPERIOR_TEMPORAL_RESTRICTION: 0
OS_SUPERIOR_NASAL_RESTRICTION: 0
METHOD: COUNTING FINGERS
OD_INFERIOR_NASAL_RESTRICTION: 0
OS_INFERIOR_TEMPORAL_RESTRICTION: 0
OD_SUPERIOR_NASAL_RESTRICTION: 0
OS_INFERIOR_NASAL_RESTRICTION: 0

## 2024-08-13 ASSESSMENT — CUP TO DISC RATIO
OS_RATIO: 0.3
OD_RATIO: 0.3

## 2024-08-13 ASSESSMENT — SLIT LAMP EXAM - LIDS
COMMENTS: NORMAL
COMMENTS: NORMAL

## 2024-08-13 ASSESSMENT — REFRACTION_WEARINGRX
OS_CYLINDER: +0.25
OS_AXIS: 160
OD_AXIS: 010
OD_CYLINDER: +0.50
OD_SPHERE: -0.50
OS_SPHERE: -0.75

## 2024-08-13 ASSESSMENT — EXTERNAL EXAM - RIGHT EYE: OD_EXAM: NORMAL

## 2024-08-13 ASSESSMENT — TONOMETRY
IOP_METHOD: TONOPEN
OS_IOP_MMHG: 15
OD_IOP_MMHG: 15

## 2024-08-13 ASSESSMENT — EXTERNAL EXAM - LEFT EYE: OS_EXAM: NORMAL

## 2024-08-13 NOTE — PROGRESS NOTES
"CC -  Blurred peripheral vision left eye     INTERVAL HISTORY - Started buproprion 300 mg qd, states that this has been helping with some of the anxiety. Blind spot has been larger.     Freeway versus off the freeway, states that     Wife, pharmacist at [facility]    DANIEL -   Conor Matthew is a  34 M presenting for blurred peripheral vision in the left eye > right eye since about 2021; possibly after a head trauma that is not well remembered but possibly hitting his head to the side of the bed after a night of drinking.     Notes peripheral vision becomes \"jumpy\" after heavy exercise especially under sun light or in heat. Feels dizziness after exercise. No significant changes in other hot environments such as a sauna or steam room sans exercise. Primarily associated with elevated cardiovascular effort rather than lifting.     Driving is difficult because he feels he had tunnel vision and it is difficult for him to focus at one thing for long time. Looking at objects at distance bugs him but looking at near objects is easier. Feels that this has changed slightly over time with improvement in anxiety symptoms and initiation of buproprion.    No family of eye diseases  Works at home, is a salesperson     RETINAL IMAGING:  OCT 08/13/24  OD - within normal limits; PHF attached  OS - foveal contour within normal limits and PHF attached; 1x2 DD area of choroidal elevation with slight hyperreflectivity within mid-choroid. Overlying RPE irregularity and mottling. No SRF/IRF; stable compared to prior studies    FA 10/10/23  Right eye within normal limits   Left eye normal transit time; lesion showed up in choroidal phase; F increased minimally but stayed mostly stable until the late phase      ASSESSMENT & PLAN    1. Choroidal granuloma    2. Choroid scar of left eye    3. Myopia of both eyes    4. Subjective vision disturbance, bilateral      - Stable appearance, no evolution over time on exam or imaging  - FA compatible " with stable long standing choroidal scar with overlying RPE atrophy; OCT showing some elevation at level of choroid and slight RPE mottling but no changes over time  - Normal TB, Syphilis, lysozyme, Lyme, CBC, CMP, ESR, CRP  - Ddx: unlikely idiopathic type 1 CNV vs choroidal scar vs choroidal granuloma  - Does not explain episodic visual symptoms and photophobia, since there is no evidence of inflammation or evolution. Most likely incidental finding in the context of visual complaints. Stable since last year; mostly likely an active lesion that doesn't need frequent follow ups  - Will refer to neurophthalmology for ongoing workup given episodic nature of complaints and likelihood of association with CNS/vasculature     RTC: refer to neuroophthalmalogy  Follow-up with retina if needed or after 3-4 years      Steve Patricia MD  PGY-3, Ophthalmology  [facility]    Complete documentation of historical and exam elements from today's encounter can be found in the full encounter summary report (not reduplicated in this progress note). I personally obtained the chief complaint(s) and history of present illness.  I confirmed and edited as necessary the review of systems, past medical/surgical history, family history, social history, and examination findings as documented by others; and I examined the patient myself. I personally reviewed the relevant tests, images, and reports as documented above. I formulated and edited as necessary the assessment and plan and discussed the findings and management plan with the patient and family.     Lc Frank MD, PhD

## 2024-08-13 NOTE — NURSING NOTE
Chief Complaints and History of Present Illnesses   Patient presents with    Follow Up     10 month follow up Choroidal granuloma      Chief Complaint(s) and History of Present Illness(es)       Follow Up              Comments: 10 month follow up Choroidal granuloma               Comments    Pt states vision is the same as last visit. No eye pain today. No new flashes or floaters.  No redness or dryness.    LIZBET Baron August 13, 2024 7:30 AM

## 2024-08-14 ENCOUNTER — NURSE TRIAGE (OUTPATIENT)
Dept: FAMILY MEDICINE | Facility: CLINIC | Age: 34
End: 2024-08-14
Payer: COMMERCIAL

## 2024-08-14 ENCOUNTER — MYC MEDICAL ADVICE (OUTPATIENT)
Dept: FAMILY MEDICINE | Facility: CLINIC | Age: 34
End: 2024-08-14
Payer: COMMERCIAL

## 2024-08-14 NOTE — TELEPHONE ENCOUNTER
Pt calling because around 8/4 he noticed swelling and pain on his right hamstring after playing kick ball on 8/3.  Pain and swelling was bad over the weekend but feels slightly better today.   States that it is painful with movement can be 8/10. Current pain 5/10 and sitting.   The hamstring is purple/red in color that goes down to his calf (refer to mychart picture from 8/14).  At times the leg feels tight because of the swelling. No indentation is left when he presses area with his finger.  Saw orthopedics and did an MRI of the area. No tear was found.  Pt has a family history of factor V.    Per protocol pt should be seen now in office.    Pt said that he will go in to [facility] to be seen today.    SHERON Navarro, RN  [facility]        Reason for Disposition   Thigh, calf, or ankle swelling in only one leg    Additional Information   Negative: Chest pain   Negative: Followed an insect bite and has localized swelling (e.g., small area of puffy or swollen skin)   Negative: Followed a knee injury   Negative: Ankle or foot injury   Negative: Pregnant with leg swelling or edema   Negative: Difficulty breathing at rest   Negative: Entire foot is cool or blue in comparison to other side   Negative: SEVERE swelling (e.g., swelling extends above knee, entire leg is swollen, weeping fluid)   Negative: Cast on leg or ankle and has increasing pain   Negative: Can't walk or can barely stand (new-onset)   Negative: Fever and red area (or area very tender to touch)   Negative: Patient sounds very sick or weak to the triager   Negative: Swelling of face, arm or hands  (Exception: Slight puffiness of fingers during hot weather.)   Negative: Pregnant 20 or more weeks and sudden weight gain (i.e., > 2 lbs, 1 kg in one week)   Negative: Thigh or calf pain and only 1 side and present > 1 hour   Negative: Thigh, calf, or ankle swelling in both legs, but one side is definitely more swollen  "(Exception: Longstanding difference between legs.)   Negative: MODERATE swelling of both ankles (e.g., swelling extends up to the knees) AND new-onset or worsening   Negative: Difficulty breathing with exertion AND worsening or new-onset   Negative: Looks like a boil, infected sore, deep ulcer, or other infected rash (spreading redness, pus)   Negative: Patient wants to be seen    Answer Assessment - Initial Assessment Questions  1. ONSET: \"When did the swelling start?\" (e.g., minutes, hours, days)      8/4  2. LOCATION: \"What part of the leg is swollen?\"  \"Are both legs swollen or just one leg?\"      Right hamstring. Behind the thigh   3. SEVERITY: \"How bad is the swelling?\" (e.g., localized; mild, moderate, severe)    - Localized: Small area of swelling localized to one leg.    - MILD pedal edema: Swelling limited to foot and ankle, pitting edema < 1/4 inch (6 mm) deep, rest and elevation eliminate most or all swelling.    - MODERATE edema: Swelling of lower leg to knee, pitting edema > 1/4 inch (6 mm) deep, rest and elevation only partially reduce swelling.    - SEVERE edema: Swelling extends above knee, facial or hand swelling present.       Mild to moderate  4. REDNESS: \"Does the swelling look red or infected?\"      Yes. Discoloration of the hamstring to back of calf. Purple/red color  5. PAIN: \"Is the swelling painful to touch?\" If Yes, ask: \"How painful is it?\"   (Scale 1-10; mild, moderate or severe)      Currently 5/10 without movement. When moving 8/10  6. FEVER: \"Do you have a fever?\" If Yes, ask: \"What is it, how was it measured, and when did it start?\"      Woke up Sick this Am. No fever  7. CAUSE: \"What do you think is causing the leg swelling?\"      Playing kick ball possibly.   8. MEDICAL HISTORY: \"Do you have a history of blood clots (e.g., DVT), cancer, heart failure, kidney disease, or liver failure?\"      Family history of factor V   9. RECURRENT SYMPTOM: \"Have you had leg swelling before?\" If " "Yes, ask: \"When was the last time?\" \"What happened that time?\"      No. This is the first time  10. OTHER SYMPTOMS: \"Do you have any other symptoms?\" (e.g., chest pain, difficulty breathing)        Pain on area.   11. PREGNANCY: \"Is there any chance you are pregnant?\" \"When was your last menstrual period?\"        .    Protocols used: Leg Swelling and Edema-A-OH    "
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
## 2024-09-12 ENCOUNTER — OFFICE VISIT (OUTPATIENT)
Dept: OPHTHALMOLOGY | Facility: CLINIC | Age: 34
End: 2024-09-12
Attending: OPHTHALMOLOGY
Payer: COMMERCIAL

## 2024-09-12 DIAGNOSIS — H31.002 CHOROID SCAR OF LEFT EYE: ICD-10-CM

## 2024-09-12 DIAGNOSIS — H53.483 VISUAL FIELD CONSTRICTION, BILATERAL: Primary | ICD-10-CM

## 2024-09-12 DIAGNOSIS — G43.109 MIGRAINE AURA OCCURRING WITH AND WITHOUT HEADACHE: ICD-10-CM

## 2024-09-12 PROCEDURE — 92133 CPTRZD OPH DX IMG PST SGM ON: CPT | Performed by: OPHTHALMOLOGY

## 2024-09-12 PROCEDURE — 99214 OFFICE O/P EST MOD 30 MIN: CPT | Performed by: OPHTHALMOLOGY

## 2024-09-12 PROCEDURE — 92083 EXTENDED VISUAL FIELD XM: CPT | Performed by: OPHTHALMOLOGY

## 2024-09-12 ASSESSMENT — CONF VISUAL FIELD
OD_SUPERIOR_NASAL_RESTRICTION: 0
OS_NORMAL: 1
OS_INFERIOR_NASAL_RESTRICTION: 0
OD_INFERIOR_TEMPORAL_RESTRICTION: 0
METHOD: COUNTING FINGERS
OS_SUPERIOR_TEMPORAL_RESTRICTION: 0
OD_INFERIOR_NASAL_RESTRICTION: 0
OS_SUPERIOR_NASAL_RESTRICTION: 0
OD_NORMAL: 1
OS_INFERIOR_TEMPORAL_RESTRICTION: 0
OD_SUPERIOR_TEMPORAL_RESTRICTION: 0

## 2024-09-12 ASSESSMENT — EXTERNAL EXAM - LEFT EYE: OS_EXAM: NORMAL

## 2024-09-12 ASSESSMENT — VISUAL ACUITY
OD_SC+: -1
METHOD: SNELLEN - LINEAR
OS_SC: 20/20
OD_SC: 20/20

## 2024-09-12 ASSESSMENT — TONOMETRY
OS_IOP_MMHG: 15
OD_IOP_MMHG: 18
IOP_METHOD: ICARE

## 2024-09-12 ASSESSMENT — SLIT LAMP EXAM - LIDS
COMMENTS: NORMAL
COMMENTS: NORMAL

## 2024-09-12 ASSESSMENT — CUP TO DISC RATIO
OS_RATIO: 0.3
OD_RATIO: 0.35

## 2024-09-12 ASSESSMENT — EXTERNAL EXAM - RIGHT EYE: OD_EXAM: NORMAL

## 2024-09-12 NOTE — PATIENT INSTRUCTIONS
FL-41 tinted lenses.    These can be purchased at [employer].  You can also buy these at the [employer] and they are called avulux.

## 2024-09-12 NOTE — NURSING NOTE
Chief Complaint(s) and History of Present Illness(es)       New Patient    In both eyes.  This started 3 years ago.  Presenting in peripheral vision.  Charactertized as  blurred vision.  Severity is mild.  Occurring intermittently.  Since onset it is stable.  Associated symptoms include photophobia.  Negative for eye pain and headache.  Pain was noted as 0/10. Additional comments: Conor Matthew is a 34 year old male sent for consultation by Dr. Frank for visual disturbances.  Patient reports jumpy peripheral vision, usually when driving at faster speeds and in busy environments.  Says the sunlight tends to bother him, will usually have his sunglasses on.  Has been ongoing since 2021. No eye pain but will intermittently get headaches.  Vision stable but have noticed that vision isn't right especially after a long day working from the computer. CAMILLE Nugent 9/12/2024 7:16 AM
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
## 2024-12-10 PROBLEM — S09.90XA HEAD INJURY, INITIAL ENCOUNTER: Status: RESOLVED | Noted: 2021-10-07 | Resolved: 2024-12-10

## 2024-12-11 ENCOUNTER — OFFICE VISIT (OUTPATIENT)
Dept: INTERNAL MEDICINE | Facility: CLINIC | Age: 34
End: 2024-12-11
Payer: COMMERCIAL

## 2024-12-11 VITALS
HEART RATE: 73 BPM | DIASTOLIC BLOOD PRESSURE: 74 MMHG | TEMPERATURE: 98.2 F | WEIGHT: 240.6 LBS | OXYGEN SATURATION: 100 % | BODY MASS INDEX: 30.07 KG/M2 | SYSTOLIC BLOOD PRESSURE: 114 MMHG

## 2024-12-11 DIAGNOSIS — R10.13 EPIGASTRIC PAIN: Primary | ICD-10-CM

## 2024-12-11 DIAGNOSIS — Z83.2 FAMILY HISTORY OF FACTOR V LEIDEN MUTATION: ICD-10-CM

## 2024-12-11 DIAGNOSIS — R94.4 ABNORMAL RENAL FUNCTION TEST: ICD-10-CM

## 2024-12-11 DIAGNOSIS — K21.9 GASTROESOPHAGEAL REFLUX DISEASE, UNSPECIFIED WHETHER ESOPHAGITIS PRESENT: ICD-10-CM

## 2024-12-11 DIAGNOSIS — E78.5 HYPERLIPIDEMIA LDL GOAL <70: ICD-10-CM

## 2024-12-11 PROBLEM — Z86.39 HISTORY OF DEHYDRATION: Status: RESOLVED | Noted: 2021-10-07 | Resolved: 2024-12-11

## 2024-12-11 PROCEDURE — 90656 IIV3 VACC NO PRSV 0.5 ML IM: CPT | Performed by: NURSE PRACTITIONER

## 2024-12-11 PROCEDURE — 99214 OFFICE O/P EST MOD 30 MIN: CPT | Mod: 25 | Performed by: NURSE PRACTITIONER

## 2024-12-11 PROCEDURE — 36415 COLL VENOUS BLD VENIPUNCTURE: CPT | Performed by: NURSE PRACTITIONER

## 2024-12-11 PROCEDURE — 83690 ASSAY OF LIPASE: CPT | Performed by: NURSE PRACTITIONER

## 2024-12-11 PROCEDURE — 80053 COMPREHEN METABOLIC PANEL: CPT | Performed by: NURSE PRACTITIONER

## 2024-12-11 PROCEDURE — 90471 IMMUNIZATION ADMIN: CPT | Performed by: NURSE PRACTITIONER

## 2024-12-11 PROCEDURE — 81241 F5 GENE: CPT | Performed by: NURSE PRACTITIONER

## 2024-12-11 PROCEDURE — 80061 LIPID PANEL: CPT | Performed by: NURSE PRACTITIONER

## 2024-12-11 RX ORDER — OMEPRAZOLE 40 MG/1
40 CAPSULE, DELAYED RELEASE ORAL DAILY
Qty: 90 CAPSULE | Refills: 0 | Status: SHIPPED | OUTPATIENT
Start: 2024-12-11

## 2024-12-11 RX ORDER — FAMOTIDINE 20 MG/1
TABLET, FILM COATED ORAL
COMMUNITY
Start: 2024-10-28

## 2024-12-11 NOTE — PROGRESS NOTES
Assessment & Plan     (R10.13) Epigastric pain  (primary encounter diagnosis)  (K21.9) Gastroesophageal reflux disease, unspecified whether esophagitis present  Comment: Atypical symptom presentation, with sudden onset of severe symptoms without any noted triggering events or dietary changes.  However, symptoms have been responsive to omeprazole.  Increase omeprazole back to 40 mg once daily.  Continue famotidine 20 mg 1-2 times daily as needed for breakthrough symptoms.  Labs ordered for screening.  Referral to gastroenterology for further evaluation and management, given symptom presentation.  Plan: Comprehensive metabolic panel, Lipase, Adult GI         Referral - Consult Only, omeprazole         (PRILOSEC) 40 MG DR capsule    (R94.4) Abnormal renal function test  Comment: Previous incidental finding of mildly reduced renal function, with unclear etiology.  Possibly transitory.  Labs ordered for monitoring.  Plan: Comprehensive metabolic panel    (E78.5) Hyperlipidemia LDL goal <70  Comment: Labs ordered for monitoring.  Plan: Lipid panel reflex to direct LDL Fasting    (Z83.2) Family history of factor V Leiden mutation  Comment: Family history of factor V Leiden, including in his father, who passed away from PE.  He would like testing to see if he also carries this mutation.  Plan: Factor 5 leiden mutation analysis     See Patient Instructions        Loli Perdomo is a 34 year old, presenting for the following health issues:  Gastrophageal Reflux    History of Present Illness       Reason for visit:  Heartburn  Symptom onset:  More than a month  Symptoms include:  Achiness/burn in chest and throat  Symptom intensity:  Severe  Symptom progression:  Staying the same  Had these symptoms before:  No  What makes it worse:  Laying or sitting down  What makes it better:  Standing up and taking medication   He is taking medications regularly.     Symptoms started suddenly about 6 weeks ago. No  precipitating events, as far as he knows. No dietary changes prior to symptom onset.  Started famotidine 20 mg and omeprazole 20 mg BID in early November at his wife's recommendation (she is a pharmacist), and then decreased to once daily dosing of both medications in mid-November.    Continues to have sharp epigastric pain and throat burning/pressure, although it is moderately improved with current medication. Pain radiates to bilateral chest and is worse with lying down. Does not radiate to the back or shoulders. If he has been lying down, standing and walking around is helpful.  Modifying his diet has not helped significantly.    History of IBS with blood in stool. Colonoscopy around  was unremarkable.  Increased fiber is helpful. Has not had issues with IBS symptoms for some time.    History of mildly elevated LDL cholesterol and reduced renal function. Will check with labs today.    Incidentally notes that his father  from a PE. Multiple family members have Factor V Leiden. He is interested in testing.      Review of Systems  Constitutional, HEENT, cardiovascular, pulmonary, GI, , musculoskeletal, neuro, skin, endocrine and psych systems are negative, except as otherwise noted.      Objective    /74   Pulse 73   Temp 98.2  F (36.8  C) (Temporal)   Wt 109.1 kg (240 lb 9.6 oz)   SpO2 100%   BMI 30.07 kg/m    Body mass index is 30.07 kg/m .  Physical Exam  Vitals and nursing note reviewed.   Constitutional:       General: He is not in acute distress.     Appearance: Normal appearance.   Cardiovascular:      Rate and Rhythm: Normal rate and regular rhythm.      Pulses: Normal pulses.      Heart sounds: Normal heart sounds. No murmur heard.     No friction rub. No gallop.   Pulmonary:      Effort: Pulmonary effort is normal. No respiratory distress.      Breath sounds: Normal breath sounds. No wheezing, rhonchi or rales.   Abdominal:      General: Abdomen is flat. Bowel sounds are normal. There  is no distension.      Palpations: Abdomen is soft. There is no mass.      Tenderness: There is no abdominal tenderness. There is no guarding or rebound. Negative signs include Bello's sign.      Hernia: No hernia is present.   Musculoskeletal:         General: No swelling.   Skin:     General: Skin is warm and dry.   Neurological:      General: No focal deficit present.      Mental Status: He is alert and oriented to person, place, and time.   Psychiatric:         Mood and Affect: Mood normal.         Behavior: Behavior normal.         Thought Content: Thought content normal.         Judgment: Judgment normal.          Signed Electronically by: VICTORINA Hutchins CNP

## 2024-12-12 ENCOUNTER — TRANSFERRED RECORDS (OUTPATIENT)
Dept: HEALTH INFORMATION MANAGEMENT | Facility: CLINIC | Age: 34
End: 2024-12-12
Payer: COMMERCIAL

## 2024-12-12 LAB
ALBUMIN SERPL BCG-MCNC: 4.7 G/DL (ref 3.5–5.2)
ALP SERPL-CCNC: 72 U/L (ref 40–150)
ALT SERPL W P-5'-P-CCNC: 30 U/L (ref 0–70)
ALT SERPL-CCNC: 24 IU/L (ref 0–44)
ANION GAP SERPL CALCULATED.3IONS-SCNC: 8 MMOL/L (ref 7–15)
AST SERPL W P-5'-P-CCNC: 31 U/L (ref 0–45)
AST SERPL-CCNC: 40 IU/L (ref 0–40)
BILIRUB SERPL-MCNC: 0.4 MG/DL
BUN SERPL-MCNC: 18.4 MG/DL (ref 6–20)
CALCIUM SERPL-MCNC: 9.5 MG/DL (ref 8.8–10.4)
CHLORIDE SERPL-SCNC: 102 MMOL/L (ref 98–107)
CHOLEST SERPL-MCNC: 203 MG/DL
CREAT SERPL-MCNC: 1.12 MG/DL (ref 0.67–1.17)
EGFRCR SERPLBLD CKD-EPI 2021: 90 ML/MIN/1.73M2
FASTING STATUS PATIENT QL REPORTED: NO
FASTING STATUS PATIENT QL REPORTED: NO
GLUCOSE SERPL-MCNC: 80 MG/DL (ref 70–99)
HCO3 SERPL-SCNC: 29 MMOL/L (ref 22–29)
HDLC SERPL-MCNC: 46 MG/DL
LDLC SERPL CALC-MCNC: 117 MG/DL
LIPASE SERPL-CCNC: 33 U/L (ref 13–60)
NONHDLC SERPL-MCNC: 157 MG/DL
POTASSIUM SERPL-SCNC: 4.4 MMOL/L (ref 3.4–5.3)
PROT SERPL-MCNC: 7.4 G/DL (ref 6.4–8.3)
SODIUM SERPL-SCNC: 139 MMOL/L (ref 135–145)
TRIGL SERPL-MCNC: 199 MG/DL

## 2024-12-16 ENCOUNTER — TRANSFERRED RECORDS (OUTPATIENT)
Dept: HEALTH INFORMATION MANAGEMENT | Facility: CLINIC | Age: 34
End: 2024-12-16
Payer: COMMERCIAL

## 2025-03-16 ENCOUNTER — HEALTH MAINTENANCE LETTER (OUTPATIENT)
Age: 35
End: 2025-03-16

## 2025-04-30 ENCOUNTER — TRANSFERRED RECORDS (OUTPATIENT)
Dept: HEALTH INFORMATION MANAGEMENT | Facility: CLINIC | Age: 35
End: 2025-04-30
Payer: COMMERCIAL